# Patient Record
Sex: MALE | Race: WHITE | NOT HISPANIC OR LATINO | Employment: STUDENT | URBAN - METROPOLITAN AREA
[De-identification: names, ages, dates, MRNs, and addresses within clinical notes are randomized per-mention and may not be internally consistent; named-entity substitution may affect disease eponyms.]

---

## 2017-05-22 ENCOUNTER — COMMUNICATION - HEALTHEAST (OUTPATIENT)
Dept: FAMILY MEDICINE | Facility: CLINIC | Age: 22
End: 2017-05-22

## 2017-05-22 DIAGNOSIS — S22.000A COMPRESSION FRACTURE OF BODY OF THORACIC VERTEBRA (H): ICD-10-CM

## 2017-05-25 ENCOUNTER — COMMUNICATION - HEALTHEAST (OUTPATIENT)
Dept: FAMILY MEDICINE | Facility: CLINIC | Age: 22
End: 2017-05-25

## 2017-05-25 DIAGNOSIS — S22.000A COMPRESSION FRACTURE OF BODY OF THORACIC VERTEBRA (H): ICD-10-CM

## 2017-07-12 ENCOUNTER — TRANSFERRED RECORDS (OUTPATIENT)
Dept: HEALTH INFORMATION MANAGEMENT | Facility: CLINIC | Age: 22
End: 2017-07-12

## 2017-07-12 ENCOUNTER — RECORDS - HEALTHEAST (OUTPATIENT)
Dept: ADMINISTRATIVE | Facility: OTHER | Age: 22
End: 2017-07-12

## 2017-07-12 ENCOUNTER — COMMUNICATION - HEALTHEAST (OUTPATIENT)
Dept: FAMILY MEDICINE | Facility: CLINIC | Age: 22
End: 2017-07-12

## 2017-07-13 ENCOUNTER — TRANSFERRED RECORDS (OUTPATIENT)
Dept: HEALTH INFORMATION MANAGEMENT | Facility: CLINIC | Age: 22
End: 2017-07-13

## 2017-07-13 ENCOUNTER — RECORDS - HEALTHEAST (OUTPATIENT)
Dept: ADMINISTRATIVE | Facility: OTHER | Age: 22
End: 2017-07-13

## 2017-08-07 ENCOUNTER — COMMUNICATION - HEALTHEAST (OUTPATIENT)
Dept: FAMILY MEDICINE | Facility: CLINIC | Age: 22
End: 2017-08-07

## 2017-08-21 ENCOUNTER — COMMUNICATION - HEALTHEAST (OUTPATIENT)
Dept: FAMILY MEDICINE | Facility: CLINIC | Age: 22
End: 2017-08-21

## 2017-08-21 ENCOUNTER — OFFICE VISIT - HEALTHEAST (OUTPATIENT)
Dept: FAMILY MEDICINE | Facility: CLINIC | Age: 22
End: 2017-08-21

## 2017-08-21 DIAGNOSIS — S22.000A COMPRESSION FRACTURE OF BODY OF THORACIC VERTEBRA (H): ICD-10-CM

## 2017-08-21 DIAGNOSIS — J02.0 ACUTE STREPTOCOCCAL PHARYNGITIS: ICD-10-CM

## 2017-08-21 DIAGNOSIS — R50.9 FEVER: ICD-10-CM

## 2017-08-21 ASSESSMENT — MIFFLIN-ST. JEOR: SCORE: 1684.48

## 2017-08-24 ENCOUNTER — OFFICE VISIT - HEALTHEAST (OUTPATIENT)
Dept: FAMILY MEDICINE | Facility: CLINIC | Age: 22
End: 2017-08-24

## 2017-08-24 ENCOUNTER — COMMUNICATION - HEALTHEAST (OUTPATIENT)
Dept: FAMILY MEDICINE | Facility: CLINIC | Age: 22
End: 2017-08-24

## 2017-08-24 DIAGNOSIS — J06.9 VIRAL URI WITH COUGH: ICD-10-CM

## 2017-08-24 DIAGNOSIS — J02.0 STREP PHARYNGITIS: ICD-10-CM

## 2018-01-11 ENCOUNTER — MEDICAL CORRESPONDENCE (OUTPATIENT)
Dept: HEALTH INFORMATION MANAGEMENT | Facility: CLINIC | Age: 23
End: 2018-01-11

## 2018-01-11 ENCOUNTER — COMMUNICATION - HEALTHEAST (OUTPATIENT)
Dept: FAMILY MEDICINE | Facility: CLINIC | Age: 23
End: 2018-01-11

## 2018-01-11 ENCOUNTER — TRANSFERRED RECORDS (OUTPATIENT)
Dept: HEALTH INFORMATION MANAGEMENT | Facility: CLINIC | Age: 23
End: 2018-01-11

## 2018-01-11 ENCOUNTER — OFFICE VISIT - HEALTHEAST (OUTPATIENT)
Dept: FAMILY MEDICINE | Facility: CLINIC | Age: 23
End: 2018-01-11

## 2018-01-11 DIAGNOSIS — S22.000A COMPRESSION FRACTURE OF BODY OF THORACIC VERTEBRA (H): ICD-10-CM

## 2018-01-11 DIAGNOSIS — M85.80 OSTEOPENIA: ICD-10-CM

## 2018-01-12 LAB
25(OH)D3 SERPL-MCNC: 26 NG/ML (ref 30–80)
25(OH)D3 SERPL-MCNC: 26 NG/ML (ref 30–80)

## 2018-01-14 ENCOUNTER — COMMUNICATION - HEALTHEAST (OUTPATIENT)
Dept: FAMILY MEDICINE | Facility: CLINIC | Age: 23
End: 2018-01-14

## 2018-01-22 ENCOUNTER — COMMUNICATION - HEALTHEAST (OUTPATIENT)
Dept: FAMILY MEDICINE | Facility: CLINIC | Age: 23
End: 2018-01-22

## 2018-02-09 ENCOUNTER — COMMUNICATION - HEALTHEAST (OUTPATIENT)
Dept: FAMILY MEDICINE | Facility: CLINIC | Age: 23
End: 2018-02-09

## 2018-02-11 ENCOUNTER — HEALTH MAINTENANCE LETTER (OUTPATIENT)
Age: 23
End: 2018-02-11

## 2018-07-19 ENCOUNTER — COMMUNICATION - HEALTHEAST (OUTPATIENT)
Dept: FAMILY MEDICINE | Facility: CLINIC | Age: 23
End: 2018-07-19

## 2018-07-19 DIAGNOSIS — S22.000A COMPRESSION FRACTURE OF BODY OF THORACIC VERTEBRA (H): ICD-10-CM

## 2018-07-23 ENCOUNTER — RECORDS - HEALTHEAST (OUTPATIENT)
Dept: ADMINISTRATIVE | Facility: OTHER | Age: 23
End: 2018-07-23

## 2018-07-31 ENCOUNTER — COMMUNICATION - HEALTHEAST (OUTPATIENT)
Dept: FAMILY MEDICINE | Facility: CLINIC | Age: 23
End: 2018-07-31

## 2019-10-14 ENCOUNTER — OFFICE VISIT - HEALTHEAST (OUTPATIENT)
Dept: FAMILY MEDICINE | Facility: CLINIC | Age: 24
End: 2019-10-14

## 2019-10-14 ENCOUNTER — COMMUNICATION - HEALTHEAST (OUTPATIENT)
Dept: FAMILY MEDICINE | Facility: CLINIC | Age: 24
End: 2019-10-14

## 2019-10-14 DIAGNOSIS — R53.83 FATIGUE, UNSPECIFIED TYPE: ICD-10-CM

## 2019-10-14 DIAGNOSIS — S59.901A ELBOW INJURY, RIGHT, INITIAL ENCOUNTER: ICD-10-CM

## 2019-10-14 RX ORDER — ERGOCALCIFEROL 1.25 MG/1
50000 CAPSULE ORAL
Status: SHIPPED | COMMUNITY
Start: 2017-10-11 | End: 2021-08-31

## 2019-10-14 ASSESSMENT — MIFFLIN-ST. JEOR: SCORE: 1819.65

## 2019-10-16 ENCOUNTER — RECORDS - HEALTHEAST (OUTPATIENT)
Dept: GENERAL RADIOLOGY | Facility: CLINIC | Age: 24
End: 2019-10-16

## 2019-10-16 ENCOUNTER — AMBULATORY - HEALTHEAST (OUTPATIENT)
Dept: LAB | Facility: CLINIC | Age: 24
End: 2019-10-16

## 2019-10-16 DIAGNOSIS — S59.901A UNSPECIFIED INJURY OF RIGHT ELBOW, INITIAL ENCOUNTER: ICD-10-CM

## 2019-10-16 DIAGNOSIS — R53.83 FATIGUE, UNSPECIFIED TYPE: ICD-10-CM

## 2019-10-16 LAB
ALBUMIN SERPL-MCNC: 4.5 G/DL (ref 3.5–5)
ALP SERPL-CCNC: 103 U/L (ref 45–120)
ALT SERPL W P-5'-P-CCNC: 17 U/L (ref 0–45)
ANION GAP SERPL CALCULATED.3IONS-SCNC: 11 MMOL/L (ref 5–18)
AST SERPL W P-5'-P-CCNC: 16 U/L (ref 0–40)
BILIRUB SERPL-MCNC: 0.5 MG/DL (ref 0–1)
BUN SERPL-MCNC: 18 MG/DL (ref 8–22)
CALCIUM SERPL-MCNC: 9.9 MG/DL (ref 8.5–10.5)
CHLORIDE BLD-SCNC: 101 MMOL/L (ref 98–107)
CO2 SERPL-SCNC: 26 MMOL/L (ref 22–31)
CREAT SERPL-MCNC: 0.91 MG/DL (ref 0.7–1.3)
GFR SERPL CREATININE-BSD FRML MDRD: >60 ML/MIN/1.73M2
GLUCOSE BLD-MCNC: 80 MG/DL (ref 70–125)
POTASSIUM BLD-SCNC: 4.1 MMOL/L (ref 3.5–5)
PROT SERPL-MCNC: 7.7 G/DL (ref 6–8)
SODIUM SERPL-SCNC: 138 MMOL/L (ref 136–145)
T3 SERPL-MCNC: 100 NG/DL (ref 45–175)
T3FREE SERPL-MCNC: 3.3 PG/ML (ref 1.9–3.9)
T4 FREE SERPL-MCNC: 1 NG/DL (ref 0.7–1.8)
VIT B12 SERPL-MCNC: 395 PG/ML (ref 213–816)

## 2019-10-17 ENCOUNTER — COMMUNICATION - HEALTHEAST (OUTPATIENT)
Dept: FAMILY MEDICINE | Facility: CLINIC | Age: 24
End: 2019-10-17

## 2019-10-17 ENCOUNTER — AMBULATORY - HEALTHEAST (OUTPATIENT)
Dept: FAMILY MEDICINE | Facility: CLINIC | Age: 24
End: 2019-10-17

## 2019-10-17 DIAGNOSIS — S59.901A ELBOW INJURY, RIGHT, INITIAL ENCOUNTER: ICD-10-CM

## 2019-10-17 LAB
25(OH)D3 SERPL-MCNC: 18.6 NG/ML (ref 30–80)
B BURGDOR IGG+IGM SER QL: 0.09 INDEX VALUE
THYROID PEROXIDASE ANTIBODIES - HISTORICAL: <3 IU/ML (ref 0–5.6)

## 2019-10-18 LAB
EBV EA-D IGG SER-ACNC: 0.2 AI (ref 0–0.8)
EBV NA IGG SER IA-ACNC: >8 AI (ref 0–0.8)
EBV VCA IGG SER IA-ACNC: >8 AI (ref 0–0.8)
EBV VCA IGM SER IA-ACNC: 1.2 AI (ref 0–0.8)

## 2019-10-19 LAB — T3REVERSE SERPL-MCNC: 15.5 NG/DL (ref 9–27)

## 2019-10-29 ENCOUNTER — COMMUNICATION - HEALTHEAST (OUTPATIENT)
Dept: FAMILY MEDICINE | Facility: CLINIC | Age: 24
End: 2019-10-29

## 2020-03-20 ENCOUNTER — OFFICE VISIT - HEALTHEAST (OUTPATIENT)
Dept: FAMILY MEDICINE | Facility: CLINIC | Age: 25
End: 2020-03-20

## 2020-03-20 ENCOUNTER — COMMUNICATION - HEALTHEAST (OUTPATIENT)
Dept: FAMILY MEDICINE | Facility: CLINIC | Age: 25
End: 2020-03-20

## 2020-03-20 DIAGNOSIS — F41.9 ANXIETY: ICD-10-CM

## 2020-03-20 DIAGNOSIS — F51.02 ADJUSTMENT INSOMNIA: ICD-10-CM

## 2020-06-01 ENCOUNTER — COMMUNICATION - HEALTHEAST (OUTPATIENT)
Dept: FAMILY MEDICINE | Facility: CLINIC | Age: 25
End: 2020-06-01

## 2020-06-01 DIAGNOSIS — M35.7 HYPERMOBILITY SYNDROME: ICD-10-CM

## 2020-08-25 ENCOUNTER — COMMUNICATION - HEALTHEAST (OUTPATIENT)
Dept: FAMILY MEDICINE | Facility: CLINIC | Age: 25
End: 2020-08-25

## 2020-08-27 ENCOUNTER — AMBULATORY - HEALTHEAST (OUTPATIENT)
Dept: FAMILY MEDICINE | Facility: CLINIC | Age: 25
End: 2020-08-27

## 2020-08-27 DIAGNOSIS — F41.9 ANXIETY: ICD-10-CM

## 2020-09-11 ENCOUNTER — COMMUNICATION - HEALTHEAST (OUTPATIENT)
Dept: FAMILY MEDICINE | Facility: CLINIC | Age: 25
End: 2020-09-11

## 2020-10-19 ENCOUNTER — COMMUNICATION - HEALTHEAST (OUTPATIENT)
Dept: FAMILY MEDICINE | Facility: CLINIC | Age: 25
End: 2020-10-19

## 2020-11-16 ENCOUNTER — COMMUNICATION - HEALTHEAST (OUTPATIENT)
Dept: FAMILY MEDICINE | Facility: CLINIC | Age: 25
End: 2020-11-16

## 2020-11-23 ENCOUNTER — AMBULATORY - HEALTHEAST (OUTPATIENT)
Dept: OTHER | Facility: CLINIC | Age: 25
End: 2020-11-23

## 2020-11-23 ENCOUNTER — OFFICE VISIT - HEALTHEAST (OUTPATIENT)
Dept: FAMILY MEDICINE | Facility: CLINIC | Age: 25
End: 2020-11-23

## 2020-11-23 ENCOUNTER — DOCUMENTATION ONLY (OUTPATIENT)
Dept: OTHER | Facility: CLINIC | Age: 25
End: 2020-11-23

## 2020-11-23 DIAGNOSIS — F33.8 SEASONAL AFFECTIVE DISORDER (H): ICD-10-CM

## 2020-11-23 ASSESSMENT — PATIENT HEALTH QUESTIONNAIRE - PHQ9: SUM OF ALL RESPONSES TO PHQ QUESTIONS 1-9: 11

## 2021-01-18 ENCOUNTER — COMMUNICATION - HEALTHEAST (OUTPATIENT)
Dept: FAMILY MEDICINE | Facility: CLINIC | Age: 26
End: 2021-01-18

## 2021-01-18 DIAGNOSIS — F33.8 SEASONAL AFFECTIVE DISORDER (H): ICD-10-CM

## 2021-01-18 RX ORDER — CITALOPRAM HYDROBROMIDE 10 MG/1
10 TABLET ORAL DAILY
Qty: 30 TABLET | Refills: 5 | Status: SHIPPED | OUTPATIENT
Start: 2021-01-18 | End: 2021-08-05

## 2021-01-19 ENCOUNTER — COMMUNICATION - HEALTHEAST (OUTPATIENT)
Dept: FAMILY MEDICINE | Facility: CLINIC | Age: 26
End: 2021-01-19

## 2021-01-19 DIAGNOSIS — F33.8 SEASONAL AFFECTIVE DISORDER (H): ICD-10-CM

## 2021-03-23 ENCOUNTER — COMMUNICATION - HEALTHEAST (OUTPATIENT)
Dept: FAMILY MEDICINE | Facility: CLINIC | Age: 26
End: 2021-03-23

## 2021-03-25 ENCOUNTER — COMMUNICATION - HEALTHEAST (OUTPATIENT)
Dept: FAMILY MEDICINE | Facility: CLINIC | Age: 26
End: 2021-03-25

## 2021-03-25 ENCOUNTER — OFFICE VISIT - HEALTHEAST (OUTPATIENT)
Dept: FAMILY MEDICINE | Facility: CLINIC | Age: 26
End: 2021-03-25

## 2021-03-25 DIAGNOSIS — F33.8 SEASONAL AFFECTIVE DISORDER (H): ICD-10-CM

## 2021-03-25 DIAGNOSIS — J30.2 SEASONAL ALLERGIC RHINITIS, UNSPECIFIED TRIGGER: ICD-10-CM

## 2021-03-25 RX ORDER — MONTELUKAST SODIUM 10 MG/1
10 TABLET ORAL DAILY
Qty: 30 TABLET | Refills: 2 | Status: SHIPPED | OUTPATIENT
Start: 2021-03-25 | End: 2021-08-31

## 2021-05-27 ASSESSMENT — PATIENT HEALTH QUESTIONNAIRE - PHQ9: SUM OF ALL RESPONSES TO PHQ QUESTIONS 1-9: 11

## 2021-05-31 VITALS — BODY MASS INDEX: 17.36 KG/M2 | WEIGHT: 138 LBS

## 2021-05-31 VITALS — BODY MASS INDEX: 16.66 KG/M2 | WEIGHT: 134 LBS | HEIGHT: 75 IN

## 2021-05-31 VITALS — BODY MASS INDEX: 17.29 KG/M2 | WEIGHT: 137.4 LBS

## 2021-06-02 NOTE — PROGRESS NOTES
"SUBJECTIVE: Oscar Newby is a 24 y.o. male with:   Chief Complaint   Patient presents with     Fatigue     Arm Injury     right arm injury x 1 1/2 month during a fall        1) Fatigue - He feels tired all the time.  Has been sleeping 8-10 hours a night.  Falls asleep well but wakes up 2-3 x a night.  Eating well and exercising. He did get a flu- like illness about 1 month ago.   No fevers/ rhinorrhea.  No tick bite.  No rashes.  Has some myalgias from physical activity.  Not napping during day.  Caffeine - minimal.  Mood is ok.  No cognitive issues.  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    2) Was doing some rock climbing.  Had a fall and landed on his right arm that was extended with palm out.  Had cracking sensation in elbow.  Was able to move arm ok so did not get seen.  No swelling / bruising.  He laid off his rock climbing for a bit.  Last week went climbing and had some aching in the elbow that went down the arm.  He has a history of hypermobility syndrome.    SH: Single.  Just graduated from college.  Living with parents but plans to move out when he gets a job.    OBJECTIVE: /68 (Patient Site: Left Arm, Patient Position: Sitting, Cuff Size: Adult Regular)   Pulse (!) 59   Ht 6' 2.75\" (1.899 m)   Wt 163 lb 12.8 oz (74.3 kg)   SpO2 98%   BMI 20.61 kg/m     No acute distress.  HEENT: Head is atraumatic and/normocephalic.  PERRL.  Conjunctiva clear.  Tympanic membranes grey with normal landmarks and normal light reflexes.  No nasal discharge.  Oropharynx is pink and moist.  Sinuses nontender.  Neck: Supple.  No lymphadenopathy or thyromegaly.  Lungs: Clear to auscultation.  No wheezing, retractions, or tachypnea.  Heart: RRR. S1 and S2 normal.  No murmurs, rubs, or gallops.  Neuro: Awake, alert, oriented x 3.  Normal strength and tone.  Normal gait.   Right arm: No swelling / ecchymosis / deformities.  Normal range of motion but some snapping in the elbow " joint.    Oscar was seen today for fatigue and arm injury.    Diagnoses and all orders for this visit:    Elbow injury, right, initial encounter- Will check xray and if negative, refer for PT.  -     XR Elbow Right 2 VWS; Future    Fatigue, unspecified type- Etiology unclear.  Will check labs.  -     Comprehensive Metabolic Panel; Future  -     Vitamin D, Total (25-Hydroxy); Future  -     Vitamin B12; Future  -     T3 (Triiodothyronine), Free; Future  -     T4, Free; Future  -     Thyroid Peroxidase Antibody; Future  -     Triiodothyronine (T3), Reverse; Future  -     T3, Total; Future  -     Lyme Antibody Cascade; Future  -     Bernice-Barr Virus (EBV) Antibodies, IgG; Future  -     Bernice-Barr Virus (EBV) Capsid Antibody,IGM(EBVCAM); Future     Liesa Navarro

## 2021-06-02 NOTE — TELEPHONE ENCOUNTER
LMTCB . Please assist patient in scheduling an appointment when the patient returns the call. Thank you .

## 2021-06-02 NOTE — TELEPHONE ENCOUNTER
10-21-19  Hello just connecting back,Specialty Scheduling talked to pt to schedule Physical Therapy, he was informed with the insurance he has [facility] with a PCC listed ([facility]) [facility] is out of network, he said he was going to do some research and seek other facilitys   Thanks  Narda

## 2021-06-03 VITALS
SYSTOLIC BLOOD PRESSURE: 122 MMHG | HEART RATE: 59 BPM | HEIGHT: 75 IN | OXYGEN SATURATION: 98 % | BODY MASS INDEX: 20.37 KG/M2 | WEIGHT: 163.8 LBS | DIASTOLIC BLOOD PRESSURE: 68 MMHG

## 2021-06-04 ENCOUNTER — COMMUNICATION - HEALTHEAST (OUTPATIENT)
Dept: FAMILY MEDICINE | Facility: CLINIC | Age: 26
End: 2021-06-04

## 2021-06-04 DIAGNOSIS — G89.29 CHRONIC LEFT SHOULDER PAIN: ICD-10-CM

## 2021-06-04 DIAGNOSIS — M25.512 CHRONIC LEFT SHOULDER PAIN: ICD-10-CM

## 2021-06-07 NOTE — PATIENT INSTRUCTIONS - HE
"Magnesium glycinate 400 mg at bedtime or try MyoCalm powder ( dose on bottle)  L-theanine 100 - 200 mg two times a day for anxiety    [email or web address]  Dr. Geoff Beatty - holistic sleep specialist, [email or web address] , book- \" The Healing Night\"    [email or web address] - counseling options  [facility] at [facility] - facebook page has mind body options to reduce stress/ anxiety    For SAD:  Get a full spectrum light to use for 20-30 minutes in the am starting in the fall    Update me or schedule a f/u phone visit in 1 month          "

## 2021-06-07 NOTE — PROGRESS NOTES
"Oscar Newby is a 24 y.o. male who is being evaluated via a billable telephone visit.      The patient has been notified of following:     \"This telephone visit will be conducted via a call between you and your physician/provider. We have found that certain health care needs can be provided without the need for a physical exam.  This service lets us provide the care you need with a short phone conversation.  If a prescription is necessary we can send it directly to your pharmacy.  If lab work is needed we can place an order for that and you can then stop by our lab to have the test done at a later time.    If during the course of the call the physician/provider feels a telephone visit is not appropriate, you will not be charged for this service.\"         Oscar Newby complains of    Chief Complaint   Patient presents with     Mental Health Problem     Sleep issues   1) He has not been sleeping well for awhile.  Going on for 1 year.  Has had a lot going on in the last year- graduated from college/ traveled to [place] / got job/ got 's license.  He will wake up in middle of night.  Having more anxiety.  Goes to bed before midnight - tries to stick with 8 hours of sleep.  Falls asleep in a few minutes.  Will get 3-4 hours of sleep then go in/ out of sleep.  Does not feel rested in am.  Hard to recover from a workout.  Has been sleeping better since quarantined.  He has tried a number of interventions - colder room / weighted blanket / new mattress and mattress top / melatonin 3 mg - 6 mg / darkness / humidifier.      2) Anxiety/ Mood - Mother's family - males have SAD.  Had tough time this winter with mood.  Mood little better now that light is improved.    3) Mono- Had acute mono this fall.  Feels his energy level is better.  Was also found to have low vitamin B12/ vitamin D and he is taking supplements regularly.    SH: He lives with 2 friends.  Was doing office work.      I have reviewed and updated the " "patient's Past Medical History, Social History, Family History and Medication List.    ALLERGIES  Patient has no known allergies.      Assessment/Plan:  1. Adjustment insomnia  See suggestions below.    2. Anxiety  Magnesium/ L- theanine/ mind-body skills / counseling.    Patient Instructions   Magnesium glycinate 400 mg at bedtime or try MyoCalm powder ( dose on bottle)  L-theanine 100 - 200 mg two times a day for anxiety    [email or web address]  Dr. Geoff Beatty - holistic sleep specialist, [employer] , book- \" The Healing Night\"    ticketea - counseling options  [facility] at [facility] [employer] page has mind body options to reduce stress/ anxiety    For SAD:  Get a full spectrum light to use for 20-30 minutes in the am starting in the fall    Update me or schedule a f/u phone visit in 1 month             Phone call duration:  18 minutes    [facility]     Leisa Navarro MD    "

## 2021-06-12 NOTE — PROGRESS NOTES
"OFFICE VISIT - FAMILY MEDICINE     ASSESSMENT AND PLAN     1. Acute streptococcal pharyngitis  amoxicillin (AMOXIL) 875 MG tablet   2. Fever  Rapid Strep A Screen-Throat    Rapid Strep A Screen-Throat   3. Compression fracture of body of thoracic vertebra     Continue with good hydration, extra vitamin C, gargle with salt and water as tolerated, cough drops as needed, take the prescribed antibiotic as directed, possible side effect discussed.  Return if not improving.  Continue current treatment of osteopenia with vitamin D and  calcium, follow-up with Dr. Navarro.  CHIEF COMPLAINT   Rash (\"HIVES X 2 DAYS\"/VERY SICK/ACHY); Medication Refill (VIT D); Fatigue (X 4 DAYS); and Fever (X 4 DAYS)    HPI   Oscar Newby is a 22 y.o. male.  No Patient Care Coordination Note on file.    Patient has not been doing fine for the last for 5 days, has been noticing some rashes in different part of his body, he was able to treat that with antihistaminic over-the-counter with some improvement, for the last couple of days been having some mild scratchy throat and  low-grade fever.  Just moved to a new apartment.  No nausea or vomiting.  Does have a history of thoracic fracture with osteopenia on the bone density, currently taking vitamin D and calcium.      Review of Systems As per HPI, otherwise negative.    OBJECTIVE   /64 (Patient Site: Right Arm)  Pulse 64  Temp 100.5  F (38.1  C) (Oral)   Resp 13  Ht 6' 2.75\" (1.899 m)  Wt 134 lb (60.8 kg)  BMI 16.86 kg/m2  Physical Exam   Constitutional: He appears well-developed and well-nourished.   HENT:   Head: Normocephalic and atraumatic.   Mouth/Throat: Posterior oropharyngeal edema present.   Neck: Normal range of motion. Neck supple. No JVD present. No tracheal deviation present. No thyromegaly present.   Cardiovascular: Normal rate, regular rhythm, normal heart sounds and intact distal pulses.  Exam reveals no gallop and no friction rub.    No murmur " heard.  Pulmonary/Chest: Effort normal and breath sounds normal. No respiratory distress. He has no wheezes. He has no rales.   Musculoskeletal: He exhibits no edema or tenderness.   Lymphadenopathy:     He has no cervical adenopathy.   Psychiatric: He has a normal mood and affect. Judgment and thought content normal.       [facility]     Family History   Problem Relation Age of Onset     No Medical Problems Mother      No Medical Problems Father      No Medical Problems Brother      Social History     Social History     Marital status: Single     Spouse name: N/A     Number of children: N/A     Years of education: N/A     Occupational History     Not on file.     Social History Main Topics     Smoking status: Never Smoker     Smokeless tobacco: Not on file     Alcohol use No     Drug use: No     Sexual activity: Not on file     Other Topics Concern     Not on file     Social History Narrative       [facility]     Patient Active Problem List    Diagnosis Date Noted     Compression fracture of body of thoracic vertebra 07/12/2016     Lyme disease 06/28/2016     Esophageal Reflux      Overview Note:     Created by Conversion         Callus      Overview Note:     Created by Conversion         Acute Pharyngitis      Overview Note:     Created by Conversion         Hypermobility Syndrome      Overview Note:     Created by Conversion         No past surgical history on file.    RESULTS/CONSULTS (Lab/Rad)     Recent Results (from the past 168 hour(s))   Rapid Strep A Screen-Throat   Result Value Ref Range    Rapid Strep A Antigen Group A Strep detected (!) No Group A Strep detected, presumptive negative     No results found.  MEDICATIONS     Current Outpatient Prescriptions on File Prior to Visit   Medication Sig Dispense Refill     ergocalciferol (VITAMIN D2) 50,000 unit capsule Take 1 capsule 2x a week for 8 weeks. 16 capsule 0     No current facility-administered medications on file prior to visit.        HEALTH MAINTENANCE /  SCREENING   No Data Recorded, No Data Recorded,No Data Recorded  Immunization History   Administered Date(s) Administered     DTaP, historic 1997, 1997, 02/10/1998, 07/30/2008     Hep A, historic 07/30/2008, 11/22/2013     Hep B, historic 1997, 03/05/2001, 11/22/2013     HiB, historic 1997, 1997, 02/10/1998     IPV 1997, 02/10/1998, 10/23/1998, 03/05/2001     Influenza, Live, Nasal LAIV3 11/15/2012     Influenza,live, Nasal Laiv4 11/22/2013     MMR 10/23/1998, 03/05/2001     Meningococcal MCV4P 07/30/2008     Tdap 07/30/2008     Typhoid, historic 12/10/2013     Varicella 10/23/1998, 07/30/2008     Health Maintenance   Topic     HPV VACCINES (1 of 3 - Male 3 Dose Series)     INFLUENZA VACCINE RULE BASED (1)     TD 18+ HE      ADVANCE DIRECTIVES DISCUSSED WITH PATIENT      TDAP ADULT ONE TIME DOSE        Jose Jorge MD  Family Medicine, [facility]   This transcription uses voice recognition software, which may contain typographical errors.

## 2021-06-12 NOTE — TELEPHONE ENCOUNTER
10-28-20  Hamlet Navarro, I called pt LM with the mental health phone number to make his appointment  Thanks  Corazon

## 2021-06-12 NOTE — PROGRESS NOTES
"SUBJECTIVE:   Oscar Newby is a 22 y.o. male presents with a slightly productive cough for the last week.  He is also had a runny nose that \"ran like a faucet\" yesterday.  He was diagnosed with strep pharyngitis 3 days ago and is currently taking amoxicillin for that.  He did have a fever early on, but no fever now.  He feels like he is getting a little better.    OBJECTIVE:   Appears mildly ill.  Ears: normal  Eyes: no redness or discharge  Nose: no discharge  Oropharynx: mild erythema  Neck: no adenopathy  Lungs: clear to auscultation, no wheezes or rales and unlabored breathing. Frequent dry sounding cough  Skin: no rash.    Studies: none    ASSESSMENT:   Viral URI with a cough  Strep pharyngitis.    PLAN:     Patient Instructions     Your cough and runny nose appears to be due to a viral illness.     You also have strep and I recommend continuing your Amoxicillin.    For the cough, use an over the counter medicine with DM (dextromethorphan)           "

## 2021-06-13 NOTE — PROGRESS NOTES
"Oscar Newby is a 25 y.o. male who is being evaluated via a billable telephone visit.      The patient has been notified of following:     \"This telephone visit will be conducted via a call between you and your physician/provider. We have found that certain health care needs can be provided without the need for a physical exam.  This service lets us provide the care you need with a short phone conversation.  If a prescription is necessary we can send it directly to your pharmacy.  If lab work is needed we can place an order for that and you can then stop by our lab to have the test done at a later time.    Telephone visits are billed at different rates depending on your insurance coverage. During this emergency period, for some insurers they may be billed the same as an in-person visit.  Please reach out to your insurance provider with any questions.    If during the course of the call the physician/provider feels a telephone visit is not appropriate, you will not be charged for this service.\"    Patient has given verbal consent to a Telephone visit? Yes    What phone number would you like to be contacted at? [phone]    Patient would like to receive their AVS by AVS Preference: Kade.    Additional provider notes: no     SUBJECTIVE: Oscar Newby is a 25 y.o. male with:  Chief Complaint   Patient presents with     INITIATE SSRI    He has h/o depression and has been meeting with therapist for several sessions lately.  His therapist is recommending he start on selective serotonin reuptake inhibitor.  He has tried a number of complementary therapies in the past during the winter but no really working for patient.  He still uses light therapy and supplements.  Trying to improve sleep with magnesium / L - theanine.  He was laid off from his job but over all has been doing ok.  He has never been on any psych meds.      SH: Lives in [place] with 2 friends.  He is eating healthy.  Working on grad school " applications.  Playing AudioCatchr and doing some meditation.      Assessment/Plan:  1. Seasonal affective disorder (H)  Will start on a lower dose of Celexa and have him check in with me by My Chart in 4-6 weeks.  Discussed possible side effects on medication.  - citalopram (CELEXA) 10 MG tablet; Take 1 tablet (10 mg total) by mouth daily.  Dispense: 30 tablet; Refill: 1        Phone call duration:  12 minutes    JULISSA Gamboa

## 2021-06-15 NOTE — PROGRESS NOTES
SUBJECTIVE: Oscar Newby is a 22 y.o. male with:  Chief Complaint   Patient presents with     Follow-up     F/U TO DISCUSS REFERRAL VISITS     He has osteopenia and sees endocrinology.  He also has history of compression fractures in the lumbar spine which occurred with minimal injury.  He has some generalized back pain and feels about the same.  Dr. Giles recommended he get a consult from orthopedics at [facility] and also consider genetic testing/ evaluation.  There is history of hypermobility syndrome in his parents/ brother.  He is taking vitamin D3 5000 IU 2x daily and TUMS 2x a day.    SH: Single.  Student at  of  studying technical writing.    OBJECTIVE: BP 92/70 (Patient Site: Left Arm, Patient Position: Sitting, Cuff Size: Adult Regular)  Pulse 66  Resp 16  Wt 138 lb (62.6 kg)  SpO2 98%  BMI 17.36 kg/m2 no distress  Back: No vertebral or para lumbar tenderness.  Neuro: Normal gait.    I reviewed last Dexa and LS spines from [facility].    Oscar was seen today for follow-up.    Diagnoses and all orders for this visit:    Compression fracture of body of thoracic vertebra  -     Ambulatory referral to Orthopedics  -     Ambulatory referral to Genetics    Osteopenia  -     Ambulatory referral to Orthopedics  -     cholecalciferol, vitamin D3, (VITAMIN D3) 5,000 unit Tab; Take 2 po daily  -     Vitamin D, Total (25-Hydroxy)  -     Ambulatory referral to Genetics     Leisa Navarro

## 2021-06-16 PROBLEM — F33.8 SEASONAL AFFECTIVE DISORDER (H): Status: ACTIVE | Noted: 2021-03-25

## 2021-06-16 NOTE — PROGRESS NOTES
Oscar Newby is a 25 y.o. male who is being evaluated via a billable telephone visit.      What phone number would you like to be contacted at? [phone]   How would you like to obtain your AVS? AVS Preference: Kade.    Assessment & Plan     Oscar was seen today for follow-up.    Diagnoses and all orders for this visit:    Seasonal allergic rhinitis, unspecified trigger  -     montelukast (SINGULAIR) 10 mg tablet; Take 1 tablet (10 mg total) by mouth daily.    Seasonal affective disorder (H)      Patient Instructions   Try taking Claritin or loratadine ( generic ) 10 mg daily ( antihistamine)    Will also prescribe Singulair 10 mg daily to take which can also help    If no improvement, let me know and we will do allergy referral    You can wean citalopram 5 mg daily for 2 weeks then stop next month then I would start up again in October.   69}     Return in about 1 year (around 3/25/2022) for Recheck.    Leisa Navarro MD  [facility]   Oscar Nebwy is 25 y.o. and presents today for the following health issues   HPI  Last winter he had lot of fatigue at the end of winter.  Blood testing did show mono.  Now he is waking up tired.  He has some congestion in the right side of his nose.  Having some headaches.  He has some rhinorrhea/ sneezing.  He does have some itchy eyes.  No drainage.  Feels like allergies.  Tried Benadryl / fexofenadine but no improvement.      SAD- He continues on the citalopram 10 mg daily.  He is also doing some counseling.  This has helped his mood and has been a better winter season than in the past.  Wonders about coming off the medicine for the summer.    Patient Active Problem List   Diagnosis     Acute Pharyngitis     Hypermobility Syndrome     Esophageal Reflux     Callus     Lyme disease     Compression fracture of body of thoracic vertebra (H)        Review of Systems  No fever/ sore throat / swollen glands.      Objective        Vitals:  No vitals were obtained today due to virtual visit.    Physical Exam  none    PHQ-9 Total Score: 11 (11/23/2020  5:23 PM)           Phone call duration: 15 minutes

## 2021-06-16 NOTE — PATIENT INSTRUCTIONS - HE
Try taking Claritin or loratadine ( generic ) 10 mg daily ( antihistamine)    Will also prescribe Singulair 10 mg daily to take which can also help    If no improvement, let me know and we will do allergy referral    You can wean citalopram 5 mg daily for 2 weeks then stop next month then I would start up again in October.

## 2021-07-03 NOTE — ADDENDUM NOTE
Addendum Note by Leisa Dozier MD at 6/14/2017  5:16 PM     Author: Leisa Dozier MD Service: -- Author Type: Physician    Filed: 6/14/2017  5:16 PM Encounter Date: 5/25/2017 Status: Signed    : Leisa Dozier MD (Physician)    Addended by: LEISA DOZIER on: 6/14/2017 05:16 PM        Modules accepted: Orders

## 2021-07-03 NOTE — ADDENDUM NOTE
Addendum Note by Leisa Dozier MD at 3/20/2020  1:40 PM     Author: Leisa Dozier MD Service: -- Author Type: Physician    Filed: 4/7/2020 10:00 AM Encounter Date: 3/20/2020 Status: Signed    : Leisa Dozier MD (Physician)    Addended by: LEISA DOZIER on: 4/7/2020 10:00 AM        Modules accepted: Level of Service

## 2021-08-14 ENCOUNTER — HEALTH MAINTENANCE LETTER (OUTPATIENT)
Age: 26
End: 2021-08-14

## 2021-08-31 ENCOUNTER — VIRTUAL VISIT (OUTPATIENT)
Dept: FAMILY MEDICINE | Facility: CLINIC | Age: 26
End: 2021-08-31
Payer: COMMERCIAL

## 2021-08-31 DIAGNOSIS — F33.8 SEASONAL AFFECTIVE DISORDER (H): Primary | ICD-10-CM

## 2021-08-31 DIAGNOSIS — E55.9 VITAMIN D DEFICIENCY: ICD-10-CM

## 2021-08-31 DIAGNOSIS — F41.9 ANXIETY: ICD-10-CM

## 2021-08-31 PROCEDURE — 99213 OFFICE O/P EST LOW 20 MIN: CPT | Mod: 95 | Performed by: FAMILY MEDICINE

## 2021-08-31 RX ORDER — DESVENLAFAXINE 25 MG/1
25 TABLET, EXTENDED RELEASE ORAL DAILY
Qty: 30 TABLET | Refills: 1 | Status: SHIPPED | OUTPATIENT
Start: 2021-08-31 | End: 2021-11-10

## 2021-08-31 RX ORDER — FAMOTIDINE 20 MG
TABLET ORAL
COMMUNITY
End: 2024-08-16

## 2021-08-31 ASSESSMENT — ANXIETY QUESTIONNAIRES
2. NOT BEING ABLE TO STOP OR CONTROL WORRYING: MORE THAN HALF THE DAYS
4. TROUBLE RELAXING: MORE THAN HALF THE DAYS
GAD7 TOTAL SCORE: 11
7. FEELING AFRAID AS IF SOMETHING AWFUL MIGHT HAPPEN: SEVERAL DAYS
1. FEELING NERVOUS, ANXIOUS, OR ON EDGE: MORE THAN HALF THE DAYS
3. WORRYING TOO MUCH ABOUT DIFFERENT THINGS: MORE THAN HALF THE DAYS
5. BEING SO RESTLESS THAT IT IS HARD TO SIT STILL: SEVERAL DAYS
6. BECOMING EASILY ANNOYED OR IRRITABLE: SEVERAL DAYS

## 2021-08-31 ASSESSMENT — PATIENT HEALTH QUESTIONNAIRE - PHQ9: SUM OF ALL RESPONSES TO PHQ QUESTIONS 1-9: 10

## 2021-08-31 NOTE — PROGRESS NOTES
Oscar is a 26 year old who is being evaluated via a billable video visit.      How would you like to obtain your AVS? MyChart  If the video visit is dropped, the invitation should be resent by: Text to cell phone: [phone]  Will anyone else be joining your video visit? No      Video Start Time: 1:49 PM    Assessment & Plan     Vitamin D deficiency  Recheck level on high dose replacement.  - Vitamin D Deficiency    Seasonal affective disorder (H)  Discussed options- Genesight testing ( cannot afford now).  Retry citaloopram later in year when having symptoms.  Trial of Pristiq.  He would like to start Pristiq.  - desvenlafaxine succinate (PRISTIQ) 25 MG 24 hr tablet  Dispense: 30 tablet; Refill: 1    Anxiety  See below  - desvenlafaxine succinate (PRISTIQ) 25 MG 24 hr tablet  Dispense: 30 tablet; Refill: 1    Patient Instructions   1) Make a lab only visit to check vitamin D  2) Restart on L-theanine 100 mg - Take 1-2 twice daily  3) Start on Pristiq ( desvenlafaxine) 25 mg daily.  4) Update me with My Chart message in a few weeks                Depression Screening Follow Up    PHQ 8/31/2021   PHQ-9 Total Score 10   Q9: Thoughts of better off dead/self-harm past 2 weeks Not at all         Follow Up Actions Taken  Patient counseled, no additional follow up at this time.     See Patient Instructions    Return in about 3 years (around 8/31/2024), or My Chart message.    Leisa Navarro MD  [facility]   Oscar is a 26 year old who presents for the following health issues     HPI  He has a history of SAD. Wanted to start selective serotonin reuptake inhibitor now to make sure mood was stable during fall/ winter. He started citalopram 10 mg taking it in the mid morning but then started waking up with severe anxiety at 4 am and felt very hot -like brain on fire.  He stopped the medicine and no further issues with waking up after a few days.  He thinks he was having side  effects.  He took citalopram last year during the winter and did well.    He was trying to take the selective serotonin reuptake inhibitor to prevent decreased mood in the winter.  At this time his mood is doing well.  He does not feel depressed.  Typically feels well in summer.    He has been feeling more anxious/ stressed lately.  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  Looking for assistantship to help with finances.  His girlfriend is a nurse and he will be seeing her less.  Lots of life changes now.  Has not been very busy this summer.  He has been taking vitamin D3 92559 international unit(s) daily.  He took L-theanine for a year and then stopped.  He thinks it was helpful.    Patient Active Problem List   Diagnosis     Acute Pharyngitis     Hypermobility Syndrome     Esophageal Reflux     Callus     Lyme disease     Compression fracture of body of thoracic vertebra (H)     Seasonal affective disorder (H)              Review of Systems         Objective           Vitals:  No vitals were obtained today due to virtual visit.    Physical Exam   GENERAL: Healthy, alert and no distress  EYES: Eyes grossly normal to inspection.  No discharge or erythema, or obvious scleral/conjunctival abnormalities.  RESP: No audible wheeze, cough, or visible cyanosis.  No visible retractions or increased work of breathing.    SKIN: Visible skin clear. No significant rash, abnormal pigmentation or lesions.  NEURO: Cranial nerves grossly intact.  Mentation and speech appropriate for age.  PSYCH: Mentation appears normal, affect normal/bright, judgement and insight intact, normal speech and appearance well-groomed.                Video-Visit Details    Type of service:  Video Visit    Video End Time:2:05 PM    Originating Location (pt. Location): Home    Distant Location (provider location):  [facility]     Platform used for Video Visit: [employer]

## 2021-09-01 ASSESSMENT — ANXIETY QUESTIONNAIRES: GAD7 TOTAL SCORE: 11

## 2021-10-10 ENCOUNTER — HEALTH MAINTENANCE LETTER (OUTPATIENT)
Age: 26
End: 2021-10-10

## 2022-01-11 ENCOUNTER — OFFICE VISIT (OUTPATIENT)
Dept: FAMILY MEDICINE | Facility: CLINIC | Age: 27
End: 2022-01-11
Payer: COMMERCIAL

## 2022-01-11 VITALS
DIASTOLIC BLOOD PRESSURE: 82 MMHG | WEIGHT: 167.19 LBS | HEART RATE: 69 BPM | SYSTOLIC BLOOD PRESSURE: 106 MMHG | HEIGHT: 75 IN | BODY MASS INDEX: 20.79 KG/M2 | OXYGEN SATURATION: 97 %

## 2022-01-11 DIAGNOSIS — M35.7 HYPERMOBILITY SYNDROME: ICD-10-CM

## 2022-01-11 DIAGNOSIS — E55.9 VITAMIN D DEFICIENCY: ICD-10-CM

## 2022-01-11 DIAGNOSIS — Z20.822 EXPOSURE TO 2019 NOVEL CORONAVIRUS: ICD-10-CM

## 2022-01-11 DIAGNOSIS — R05.9 COUGH: Primary | ICD-10-CM

## 2022-01-11 DIAGNOSIS — R79.9 ABNORMAL BLOOD CHEMISTRY: ICD-10-CM

## 2022-01-11 DIAGNOSIS — S22.000A COMPRESSION FRACTURE OF BODY OF THORACIC VERTEBRA (H): ICD-10-CM

## 2022-01-11 DIAGNOSIS — Z13.220 SCREENING FOR CHOLESTEROL LEVEL: ICD-10-CM

## 2022-01-11 LAB
CHOLEST SERPL-MCNC: 241 MG/DL
FASTING STATUS PATIENT QL REPORTED: ABNORMAL
HDLC SERPL-MCNC: 72 MG/DL
LDLC SERPL CALC-MCNC: 131 MG/DL
TRIGL SERPL-MCNC: 190 MG/DL

## 2022-01-11 PROCEDURE — 36415 COLL VENOUS BLD VENIPUNCTURE: CPT | Performed by: FAMILY MEDICINE

## 2022-01-11 PROCEDURE — 82306 VITAMIN D 25 HYDROXY: CPT | Performed by: FAMILY MEDICINE

## 2022-01-11 PROCEDURE — 90471 IMMUNIZATION ADMIN: CPT | Performed by: FAMILY MEDICINE

## 2022-01-11 PROCEDURE — 99000 SPECIMEN HANDLING OFFICE-LAB: CPT | Performed by: FAMILY MEDICINE

## 2022-01-11 PROCEDURE — 99395 PREV VISIT EST AGE 18-39: CPT | Mod: 25 | Performed by: FAMILY MEDICINE

## 2022-01-11 PROCEDURE — 99213 OFFICE O/P EST LOW 20 MIN: CPT | Mod: 25 | Performed by: FAMILY MEDICINE

## 2022-01-11 PROCEDURE — 90651 9VHPV VACCINE 2/3 DOSE IM: CPT | Performed by: FAMILY MEDICINE

## 2022-01-11 PROCEDURE — 86617 LYME DISEASE ANTIBODY: CPT | Mod: 90 | Performed by: FAMILY MEDICINE

## 2022-01-11 PROCEDURE — 80061 LIPID PANEL: CPT | Performed by: FAMILY MEDICINE

## 2022-01-11 PROCEDURE — 86769 SARS-COV-2 COVID-19 ANTIBODY: CPT | Mod: 90 | Performed by: FAMILY MEDICINE

## 2022-01-11 RX ORDER — CHOLECALCIFEROL (VITAMIN D3) 125 MCG
CAPSULE ORAL
COMMUNITY
End: 2024-08-16

## 2022-01-11 ASSESSMENT — MIFFLIN-ST. JEOR: SCORE: 1826.05

## 2022-01-11 ASSESSMENT — ENCOUNTER SYMPTOMS: COUGH: 1

## 2022-01-11 NOTE — PROGRESS NOTES
SUBJECTIVE:   CC: Oscar Newby is an 26 year old male who presents for preventative health visit.       Patient has been advised of split billing requirements and indicates understanding: Yes  Healthy Habits:     Getting at least 3 servings of Calcium per day:  NO    Bi-annual eye exam:  NO    Dental care twice a year:  NO    Sleep apnea or symptoms of sleep apnea:  None    Diet:  Regular (no restrictions)    Frequency of exercise:  1 day/week    Duration of exercise:  15-30 minutes    Taking medications regularly:  Yes    Medication side effects:  None    PHQ-2 Total Score: 2    Additional concerns today:  Yes  Cough            Today's PHQ-2 Score:   PHQ-2 ( 1999 Pf[employer]) 1/11/2022   Q1: Little interest or pleasure in doing things 1   Q2: Feeling down, depressed or hopeless 1   PHQ-2 Score 2   Q1: Little interest or pleasure in doing things Several days   Q2: Feeling down, depressed or hopeless Several days   PHQ-2 Score 2       Abuse: Current or Past(Physical, Sexual or Emotional)- No  Do you feel safe in your environment? Yes        Social History     Tobacco Use     Smoking status: Never Smoker     Smokeless tobacco: Never Used   Substance Use Topics     Alcohol use: Yes     Alcohol/week: 2.0 standard drinks     Types: 2 Cans of beer per week     If you drink alcohol do you typically have >3 drinks per day or >7 drinks per week? No    Alcohol Use 1/11/2022   Prescreen: >3 drinks/day or >7 drinks/week? No       Last PSA: No results found for: PSA    Reviewed orders with patient. Reviewed health maintenance and updated orders accordingly -  Reviewed and updated as needed this visit by clinical staff  Tobacco  Allergies  Meds             Reviewed and updated as needed this visit by Provider                Review of Systems   Respiratory: Positive for cough.      Cough 25 days  Feels with bronchitis  Dry  Somewhat triggered by coughing as well as one time when he laid on his left side like a pinching  sensation  No inhalers  No history of asthma  Does have a dog at home    Doing well currently from a mental health standpoint    Family history significant for neuropathy in his father  History of hypermobile joints  History of compression fracture with prior osteoporosis diagnosis    OBJECTIVE:   There were no vitals taken for this visit.    Physical Exam      Physical:  General Appearance: Healthy-appearingy.   Head:  No deformity  Eyes: Sclerae white, pupils equal and reactive, extra ocular movements intact   Ears: Well-positioned, well-formed pinnae; TM pearly white, translucent, no bulging   Nose: Clear, normal mucosa no drainage or crusting  Throat: Lips, tongue, and mucosa are moist, pink and intact; tongue no thrush oral pharynx no injection or lesions  Neck: Supple, symmetric ROM no nodes   No carotid Buits  Chest: Lungs clear to auscultation, no retractions  Heart: Regular rate & rhythm, S1 S2, no murmur  Abdomen: Soft, non-tender, no masses; umbilical area normal   Pulses: Equal femoral pulses  : No hernia palpable   Extremities: Well-perfused, warm and dry, No Edema  Palpable Pulses Bilateral  Neuro: Easily aroused good tone NO tremor   Skin  No Rash no dysplastic looking nevi            ASSESSMENT/PLAN:   Oscar was seen today for physical and cough.    Diagnoses and all orders for this visit:    Cough  -     SARS-CoV-2 Nucleocapsid Total Ab; Future    Screening for cholesterol level  -     Lipid Profile (Chol, Trig, HDL, LDL calc); Future    Vitamin D deficiency  -     Vitamin D Deficiency; Future    Abnormal blood chemistry  -     Lyme Confirm IgG by Immunoblot; Future    Exposure to 2019 novel coronavirus  -     SARS-CoV-2 Nucleocapsid Total Ab; Future    Compression fracture of body of thoracic vertebra (H)    Hypermobility syndrome        Patient has been advised of split billing requirements and indicates understanding: No  COUNSELING:   Reviewed preventive health counseling, as reflected in  "patient instructions       Regular exercise       Healthy diet/nutrition    Estimated body mass index is 20.61 kg/m  as calculated from the following:    Height as of 10/14/19: 1.899 m (6' 2.75\").    Weight as of 10/14/19: 74.3 kg (163 lb 12.8 oz).         He reports that he has never smoked. He has never used smokeless tobacco.      Counseling Resources:  ATP IV Guidelines  Pooled Cohorts Equation Calculator  FRAX Risk Assessment  ICSI Preventive Guidelines  Dietary Guidelines for Americans, 2010  USDA's MyPlate  ASA Prophylaxis  Lung CA Screening  Here  Francisco Quiros MD  [facility]  "

## 2022-01-11 NOTE — PATIENT INSTRUCTIONS
Nice to meet you Oscar    Try to find or identify a trigger for your cough  Hopefully it would just get better over time  Worsening symptoms, increasing pain, unusual symptoms or a cluster of symptoms would need further work-up    Rechecking labs today    Will start HPV series  The first shot will be today  The next shot should be 2 months from now  The third shot can be next year at a physical examination    Actively Pursue Wellness    Eat Whole Foods with High Nutritional Value  -----High Fats Nuts, Olive Oil, Fish, Avocados  -----Lean Meats  -----Vegetables Colorful and In abundance  -----Fresh fruits  -----Beans, Legumes  and Whole Grains    Engage in Moderate Exercise  -----30-60 minutes daily    Get Intentional Restorative Sleep  -----8-10 hours    Cultivate and Maintain Healthy Relationships  -----Family and Friends  -----Work Place  -----Community      Remove and Harmful Factors  -----Stressors  Work and Home   -----Toxins:  Tobacco, Alcohol in Excess, Processed Sugars (White Stuff and High Fructose Corn Syrup, Pollutants (Air and Water)      Be Present and Mindful for Yourself and Family  -----Laugh Together  -----Talk Together and Listen to your Body and Family and Friends  -----Enjoy Meals together        There are Five Documented ways to Reduce Risk  for Chronic Illness:    Exercise Daily  30-60 minutes  No Tobacco Products  Red Wine 4-8 oz daily  BMI less than 25  Mediterranean Style Diet     Eat more Plants>> in abundance and of many colors!  Phyto-nutrients activate our health potential.  Choose:  More Colors. More Diversity  Get More Benefit.    Cultivate that inner Compost bin!    Gianni

## 2022-01-11 NOTE — LETTER
January 14, 2022      Oscar JOSHUA Odin  [address]  [place] [address]        Dear ,    We are writing to inform you of your test results.    These are all okay Oscar     Maybe decrease the amount of vitamin D  No evidence of wild-type COVID infection in the past  Triglycerides are likely slightly elevated due to not fasting  HDL excellent  LDL reasonable    In general more fiber less animal fat  Lower body movement    Great to see you and meet you!    Gianni    Adult Physical AVS      Thank you for scheduling and completing a Physical Check up!      There has been suggestions that this is an Unnecessary part of the current care for patients by some. I do not agree!    It is a reminder to take stock in an overall health plan.     It also hopefully will engage dialogue about wellness and focusing on measure to stay well and fit, hopefully avoiding extra trips to see us!      -----------Wellness Pillars-----------    1. Nutrient Dense Nutrition-- we are or will become and are activated by what we eat! It is paramount that we all focus on eating well. This means trying to eat ?hole Foods,  single ingredient foods that nourish and activate our bodies. Food that are high in nutrients help run our bodies in a way that aranda and can transform our health and help us to heal and repair. Major groups include. Fats----preferably plant based. These help ours brains and nervous system. Proteins---from plants and animals. Nuts, Beans and Animal proteins. These help our musculoskeletal system. Complex Carbohydrates---fresh fruits vegetables, and whole grains. In order to maintain balance, we must give our bodies balanced nutrition. There are things we should avoid. Most processed foods and all added sugar should be avoided. If you do not prepare your own food, order simple foods a la carte. Order a protein and pair it with a side of vegetables. Vegetable should occupy more than half of your plate. Try to avoid simple  carbohydrates like chips or fries.   2. Restorative Sleep----we all need sleep to allow our bodies to heal and repair. Sleep is indispensable and necessary. Good quality sleep is different from the simple quantitative amount of sleep. It is possible to sleep without getting any rest or restorative sleep. This is why we ask about refreshing sleep, because it is possible to sleep and not have restorative sleep. If your sleep is not refreshing, you may requires a visit with a Sleep Specialist to help sort this out. For quantity, aim for 8-10 hours daily.   3. Movement---exercise has numerous health benefits. Bottom line, in order to do the things we do in life, it is necessary to condition, nurture and repair our machine. Food provides the tools and the basic repair structure and vital nutrients. Rest allows time and focus for repair and restoration. Exercise conditions and activated reparative mechanisms. I would suggest thinking about one's high school weight as a rule and aim for a weight plus 15 pounds for men and 10 pounds for women as a goal. Also we should strive to engage in intentional activity 3 to 4 days of cardio fitness 30-60 minutes and 1-3 days of strength training. We want to be fit as we age and have stamina to do activities of daily living and beyond. Walk, bike, swim, run, box, dance, and so on, find your thing! The benefits are incredible! Exercise lowers blood pressure, helps treat and prevent diabetes and helps us live longer and in a way that is more satisfying. As Archie says,  Just Do It!  4. Mindfulness----get in touch with your mind body connection. Take time daily to reflect on and be cognizant of how you are doing----eating, working, parenting, interacting with loved ones, friends and others. Be intentional and present in relating to things in which you are engaged.     Preventative Care    Colon Cancer screening. It may not be glamorous, but it saves lives and may save yours. There is  colonoscopy and immuno-chemical testing. Pick your mode , but get it done. If there is a first degree relative that has had Colon Cancer, we may recommend screening 10 years before the age of that index case.     Breast Cancer Screening. This is mostly for women. Get to know the architecture of your breasts. If it makes you feel more comfortable, engage your partner as well. If something strikes you as ?ot right,  get it checked out. Mammogram breast cancer screening does detect cancers. Self breast exams can detect cancers. Guidelines vary but in general start screen in adulthood for self exams and mammography in your 40s. If there is a first degree relative or many with cancers, this may be earlier or involve genetic screening.     Pap Smears Cervical Cancer Screening---again women. Start screening after sexual activity or intercourse begins. Cervical cancer really is tied to exposure to HPV virus and this is related to sexual intercourse. Cervical cancer is treatable and preventable, as Pap smears should detect changes BEFORE cancerous transformation.   All women that have a cervix should be screen between 21-65 as a rule. Intervals vary based on age and medical history.     Vaccines. Yes there is controversy. But I still think vaccines save lives and reduce disease burden in our human populations. Please consider getting vaccinated as you are due for Vaccines.     About Vitamins and Supplements     I do recommend that adult and kids consider a multivitamin as way to enrich our nutrition.     A solid multivitamin. Ask one of us for recommendations. Our pharmacy carry some high quality lower cost vitamins that we have recommends.     [place] 3/6 Oils Fish Oils, Flax Oils, Krill Oils, Algea Oils, and Cumin Seed and Borage Oils are among the Omega 3s and 6s. Good oils nourish our nervous system and engage our immune system in positive ways.     Vitamin D. If you are in [place]. You probably need some. We shoot for a  level of 50-60. So sometime this takes staring lower and titrating up a supplement. Start at 5012-7541 IU wit a fatty meal and check levels.             Certain supplements may help with our gut's immune system or Microbiome.   Start with plants, many and of diverse colors.   Consider cultured foods with live active bacteria. Examples are Yogurt, Kefir and Kombucha.     Eat Prebiotic foods from the Chicori family, asparagus, bananas, inulin fibers and more.     Other supplements that may help are Zinc Carnosine, D-limonene, Vitamin D and Colostrum.     It may prudent to try the Elimination Diet and eliminate certain foods such as Wheat products, Dairy Products and Especially Refined Sugars.       We are screening multiple issues:     High Blood Pressure.   We ideally have readings less than 130 on the top number and less than 80 on the bottom.     Diabetes.   Diabetes is the body not processing sugars in an efficient way. When sugars are not dealt with in an efficient manner, every part of the body can be effected, the heart such as a heart attack or failure, the brain such as a stroke and really any part of the body. Insulin levels being overworked really drives diabetes. Lowering intake of simple sugar and exercising are two major ways to treat and stave off Diabetes.     Heart Disease:  Heart issues usually arise out of a combination of genetic issues and life stressors and choices.   Focusing on the Pillars of Wellness are ways to help prevent heart disease. Avoiding tobacco, limiting alcohol intake to moderate intake and addressing out  of balance cholesterol, presence of diabetes and persistently elevated blood pressure may require medications in addition to life style changes.     Skin cancer is typically due to cumulative Sun Damage. There are other genetic factor as well. If skin looks irritated or a mole changes color, shape, size or has irregular borders, get it checked out. Skin exams can also save  lives.     If you are Diabetic or Have Known Heart Disease. We have goals for your best Care     A1C. For Diabetics     This is a measure of how well controlled your sugars are over the last 3 months. In general, we would like the percent number less than 7% for most diabetics. In the morning and before all meals the sugar number should be less than 150. If you are diabetic and this is the case, you are managing well. The Pillars of Wellness are still a guide to keeping your body in balance.     Blood Pressure for Diabetics and Heart Disease     Top < 130 Bottom < 80  This goal of blood pressure control reduces Diabetes complications, such as stroke or heart attack. Life style first and add medication if consistently high.     No Tobacco. For Anyone!    Smoking tobacco or chewing produces by products that are particularly harmful to Diabetics, but really all of us. I implore you to not use tobacco products.     Aspirin for Heart Disease Patient     If you have high blood pressure, stroke or heart disease risk, you probably would benefit from a baby aspirin. There reasons to avoid aspirin such as allergies, risks for bleeding, etc. have the discussion of aspirin should be part of your therapy.     Statin Medications for Heart Disease, Vascular Disease or High Risk Patients    These medicine have statistically been shown to benefit Diabetic patients, especially those with disordered cholesterol profiles. I like to do an NMR panel that shows Atherogenic Risk (Stroke/ Heart Attack) and Insulin Resistance. Likely this may be recommended as part of you treatment plan.         Resulted Orders   Lipid Profile (Chol, Trig, HDL, LDL calc)   Result Value Ref Range    Cholesterol 241 (H) <=199 mg/dL    Triglycerides 190 (H) <=149 mg/dL    Direct Measure HDL 72 >=40 mg/dL      Comment:      HDL Cholesterol Reference Range:     0-2 years:   No reference ranges established for patients under 2 years old  at [facility]  for lipid analytes.    2-8 years:  Greater than 45 mg/dL     18 years and older:   Female: Greater than or equal to 50 mg/dL   Male:   Greater than or equal to 40 mg/dL    LDL Cholesterol Calculated 131 (H) <=129 mg/dL    Patient Fasting > 8hrs? Unknown    Vitamin D Deficiency   Result Value Ref Range    Vitamin D, Total (25-Hydroxy) 88 (H) 30 - 80 ug/L    Narrative    Deficiency <10.0 ug/L  Insufficiency 10.0-29.9 ug/L  Sufficiency 30.0-80.0 ug/L  Toxicity (possible) >100.0 ug/L    Lyme Confirm IgG by Immunoblot   Result Value Ref Range    Lyme Confirm IgG by Immunoblot Negative Negative      Comment:      Band(s) present: 23 kDa  (Insufficient number of bands for positive result)  INTERPRETIVE INFORMATION: B. burgdorferi IgG Immunoblot    For this assay, a positive result is reported when any 5 or   more of the following 10 bands are present: 18, 23, 28, 30,   39, 41, 45, 58, 66, or 93 kDa.  All other banding patterns   are reported as negative.  Performed By: [facility]  [address] [place] [id number]  : Margaret Collier MD   SARS-CoV-2 Nucleocapsid Total Ab   Result Value Ref Range    SARS-CoV-2 Nucleocapsid Total Ab, S Negative Negative      Comment:      No antibodies to SARS-CoV-2 detected. Negative results   may occur in serum collected too soon following   infection,in immunosuppressed patients or in patients   with mild or asymptomatic infection. This test does   not rule out active or recent COVID-19 infection and   will not detect SARS-CoV-2 vaccine-induced antibodies.  Follow up testing with a molecular test is recommended   in symptomatic patients.     -------------------ADDITIONAL INFORMATION-------------------  Testing was performed using the Roche Elecsys   Anti-SARS-CoV-2 Reagent assay from [facility],   which has received Emergency Use Authorization(EUA)  by the U.S. Food and Drug Administration.     Fact sheets for this Emergency Use Authorization (EUA)    assay can be found at the following links:      For Healthcare Providers:  https://www.fda.gov/media/685825/download  For Patients:  https://www.fda.gov/media/304744/download    Patient's Race White     Patient's Ethnicity Not        Comment:         Test Performed by:  [place] [id number]  : Sonny Xie M.D. Ph.D.; CLIA# [id number]       If you have any questions or concerns, please call the clinic at the number listed above.       Sincerely,      Francisco Quiros MD

## 2022-01-12 LAB
DEPRECATED CALCIDIOL+CALCIFEROL SERPL-MC: 88 UG/L (ref 30–80)
SARS-COV-2 AB SERPL QL IA: NEGATIVE

## 2022-01-14 LAB — B BURGDOR IGG SER QL IB: NEGATIVE

## 2022-03-08 ENCOUNTER — ANCILLARY PROCEDURE (OUTPATIENT)
Dept: GENERAL RADIOLOGY | Facility: CLINIC | Age: 27
End: 2022-03-08
Attending: NURSE PRACTITIONER
Payer: COMMERCIAL

## 2022-03-08 ENCOUNTER — OFFICE VISIT (OUTPATIENT)
Dept: URGENT CARE | Facility: URGENT CARE | Age: 27
End: 2022-03-08
Payer: COMMERCIAL

## 2022-03-08 VITALS
HEART RATE: 71 BPM | DIASTOLIC BLOOD PRESSURE: 67 MMHG | SYSTOLIC BLOOD PRESSURE: 116 MMHG | WEIGHT: 165 LBS | BODY MASS INDEX: 20.9 KG/M2

## 2022-03-08 DIAGNOSIS — S46.001A INJURY OF RIGHT ROTATOR CUFF, INITIAL ENCOUNTER: Primary | ICD-10-CM

## 2022-03-08 DIAGNOSIS — S49.91XA SHOULDER INJURY, RIGHT, INITIAL ENCOUNTER: ICD-10-CM

## 2022-03-08 PROBLEM — E55.9 VITAMIN D DEFICIENCY: Status: ACTIVE | Noted: 2017-03-22

## 2022-03-08 PROBLEM — M85.80 OSTEOPENIA: Status: ACTIVE | Noted: 2017-03-22

## 2022-03-08 PROCEDURE — 99214 OFFICE O/P EST MOD 30 MIN: CPT | Performed by: NURSE PRACTITIONER

## 2022-03-08 PROCEDURE — 73030 X-RAY EXAM OF SHOULDER: CPT | Mod: RT | Performed by: RADIOLOGY

## 2022-03-08 ASSESSMENT — PAIN SCALES - GENERAL: PAINLEVEL: MILD PAIN (2)

## 2022-03-09 NOTE — PATIENT INSTRUCTIONS
Patient Education     Rotator Cuff Tear  The rotator cuff is a group of muscles and tendons that surround the shoulder joint. These muscles and tendons hold the arm in its joint. They also help the shoulder move and rotate. The rotator cuff can be torn from overuse or injury. Gradual wear and tear can lead to inflammation of these tendons. This can progress to gradual or sudden tears.  Symptoms of a torn rotator cuff include:    Shoulder pain that gets worse when you raise your arm overhead    Weakness of the shoulder muscles with overhead activity    Popping and clicking when you move your shoulder    Shoulder pain that wakes you up at night when sleeping on the hurt shoulder  Your healthcare provider may suspect a rotator cuff injury based on your symptoms and a physical exam. You may also have an MRI or arthroscopy. Arthroscopy is a surgical procedure to look inside the joint through a small tube. X-rays may be taken to determine if there is another reason for your pain, such as an abnormality in the bone.  Partial rotator cuff tears can be treated by first resting, then strengthening the rotator cuff muscles. Anti-inflammatory medicines, such as ibuprofen or naproxen, are useful. Your healthcare provider can give you a limited number of steroid injections. Your provider may recommend surgery for complete tears and partial tears that don't respond to medical treatment.  Home care    Try to avoid activities that make your pain worse. This includes overhead activities, doing the same motion over and over, and heavy lifting.    You may use over-the-counter pain medicines to control pain, unless another medicine was prescribed. If you have chronic liver or kidney disease or ever had a stomach ulcer or gastrointestinal bleeding, talk with your healthcare provider before using these medicines.    If you were given a sling, use it for comfort. After your pain decreases, don t keep your arm in the sling all the time.  Take your arm out several times a day and move the shoulder joint, as you are able.    Your healthcare provider may recommend gentle pendulum exercises. Stand or sit with your arm vertical and close to your side. Relax your shoulder muscles and gently swing the arm forward and back, side to side, and in small circles for about 5 minutes. Do this once or twice a day. There should be only slight pain with this exercise.    You may benefit from physical therapy or a home exercise program to strengthen your shoulder muscles. This will also increase your pain-free range of motion. Applying heat before exercises can help prepare the muscles and joint for activity. Talk to your healthcare provider about what is best for your condition.  Follow-up care  Follow up with your healthcare provider, or as advised.  When to seek medical advice  Call your healthcare provider right away if the following occurs:    Increasing shoulder pain or pain radiating down the arm to the hand  Call 911  Call 911 or get immediate medical care if any of the following occur:    Rapid swelling in the involved shoulder or arm    Numbness, tingling, or loss of strength down the arm to the hand  Mary last reviewed this educational content on 5/1/2018 2000-2021 [employer]. All rights reserved. This information is not intended as a substitute for professional medical care. Always follow your healthcare professional's instructions.

## 2022-03-09 NOTE — TELEPHONE ENCOUNTER
DIAGNOSIS: Injury of right rotator cuff/ Nathalia Muenchow NP/ XR   APPOINTMENT DATE: 3.15.22   NOTES STATUS DETAILS   OFFICE NOTE from other specialist Internal 3.8.22 [facility]   MEDICATION LIST Internal    XRAYS (IMAGES & REPORTS) Internal 93.8.22 R shoulder

## 2022-03-09 NOTE — PROGRESS NOTES
Chief Complaint   Patient presents with     Shoulder Pain     injured right shoulder 1 hour ago     SUBJECTIVE:  Oscar Newby is a 26 year old male who presents to the clinic today with right shoulder injury. He was rock climbing, felt and heard crunch, tear, has shoulder joint instability, ROM limited overhead past 90 degrees. No numbness, tingling, weakness, pallor.    Past Medical History:   Diagnosis Date     Hypermobility of joint      Osteoporosis      Seasonal affective disorder (H)      desvenlafaxine succinate (PRISTIQ) 25 MG 24 hr tablet, TAKE ONE TABLET BY MOUTH ONE TIME DAILY  L-Theanine 200 MG CAPS,   MAGNESIUM GLYCINATE ORAL, [MAGNESIUM GLYCINATE ORAL] Take 400 mg by mouth daily.  Vitamin D, Cholecalciferol, 25 MCG (1000 UT) CAPS, Pt is taking 94072 units per day    No current facility-administered medications on file prior to visit.    Social History     Tobacco Use     Smoking status: Never Smoker     Smokeless tobacco: Never Used   Substance Use Topics     Alcohol use: Yes     Alcohol/week: 2.0 standard drinks     Types: 2 Cans of beer per week     No Known Allergies    Review of Systems   All systems negative except for those listed above in HPI.    EXAM:   /67   Pulse 71   Wt 74.8 kg (165 lb)   BMI 20.90 kg/m      Physical Exam  Vitals reviewed.   Constitutional:       General: He is not in acute distress.     Appearance: Normal appearance. He is not ill-appearing, toxic-appearing or diaphoretic.   HENT:      Head: Normocephalic and atraumatic.      Nose: Nose normal.      Mouth/Throat:      Mouth: Mucous membranes are moist.      Pharynx: Oropharynx is clear.   Eyes:      Extraocular Movements: Extraocular movements intact.      Conjunctiva/sclera: Conjunctivae normal.      Pupils: Pupils are equal, round, and reactive to light.   Cardiovascular:      Rate and Rhythm: Normal rate.   Pulmonary:      Effort: Pulmonary effort is normal.   Musculoskeletal:         General: Tenderness and  signs of injury present. No swelling or deformity.      Cervical back: Normal range of motion and neck supple.      Comments: Right shoulder rotator cuff, AC joint tenderness. Limited ROM cannot go above 90 degrees over head. Normal humberto lift off, empty can test, sullivan martin.   Skin:     General: Skin is warm and dry.      Findings: No bruising, erythema or rash.   Neurological:      General: No focal deficit present.      Mental Status: He is alert and oriented to person, place, and time.   Psychiatric:         Mood and Affect: Mood normal.         Behavior: Behavior normal.       Xray done in clinic read by me as negative for fracture or dislocation.  Results for orders placed or performed in visit on 03/08/22   XR Shoulder Right G/E 3 Views     Status: None    Narrative    EXAM: XR SHOULDER RIGHT G/E 3 VIEWS  LOCATION: [facility]  DATE/TIME: 3/8/2022 6:55 PM    INDICATION: He was rock climbing, felt and heard crunch, tear, has shoulder joint instability, ROM limited overhead past 90 degrees.  COMPARISON: None.      Impression    IMPRESSION: Normal joint spaces and alignment. No fracture.      ASSESSMENT:    ICD-10-CM    1. Injury of right rotator cuff, initial encounter  S46.001A Orthopedic  Referral   2. Shoulder injury, right, initial encounter  S49.91XA XR Shoulder Right G/E 3 Views     CANCELED: XR Shoulder Right 2 Views     PLAN:    Xray without fracture or dislocation  Possible rotator cuff tear, tendonitis  Rest, ice, heat, sling given in clinic  Rotate tylenol and ibuprofen  Ortho for follow-up as urgent care is limited without imaging beyond x-ray, may need steroid injection, PT, MRI, etc    Patient Instructions     Patient Education     Rotator Cuff Tear  The rotator cuff is a group of muscles and tendons that surround the shoulder joint. These muscles and tendons hold the arm in its joint. They also help the shoulder move and rotate. The rotator cuff can be  torn from overuse or injury. Gradual wear and tear can lead to inflammation of these tendons. This can progress to gradual or sudden tears.  Symptoms of a torn rotator cuff include:    Shoulder pain that gets worse when you raise your arm overhead    Weakness of the shoulder muscles with overhead activity    Popping and clicking when you move your shoulder    Shoulder pain that wakes you up at night when sleeping on the hurt shoulder  Your healthcare provider may suspect a rotator cuff injury based on your symptoms and a physical exam. You may also have an MRI or arthroscopy. Arthroscopy is a surgical procedure to look inside the joint through a small tube. X-rays may be taken to determine if there is another reason for your pain, such as an abnormality in the bone.  Partial rotator cuff tears can be treated by first resting, then strengthening the rotator cuff muscles. Anti-inflammatory medicines, such as ibuprofen or naproxen, are useful. Your healthcare provider can give you a limited number of steroid injections. Your provider may recommend surgery for complete tears and partial tears that don't respond to medical treatment.  Home care    Try to avoid activities that make your pain worse. This includes overhead activities, doing the same motion over and over, and heavy lifting.    You may use over-the-counter pain medicines to control pain, unless another medicine was prescribed. If you have chronic liver or kidney disease or ever had a stomach ulcer or gastrointestinal bleeding, talk with your healthcare provider before using these medicines.    If you were given a sling, use it for comfort. After your pain decreases, don t keep your arm in the sling all the time. Take your arm out several times a day and move the shoulder joint, as you are able.    Your healthcare provider may recommend gentle pendulum exercises. Stand or sit with your arm vertical and close to your side. Relax your shoulder muscles and gently  swing the arm forward and back, side to side, and in small circles for about 5 minutes. Do this once or twice a day. There should be only slight pain with this exercise.    You may benefit from physical therapy or a home exercise program to strengthen your shoulder muscles. This will also increase your pain-free range of motion. Applying heat before exercises can help prepare the muscles and joint for activity. Talk to your healthcare provider about what is best for your condition.  Follow-up care  Follow up with your healthcare provider, or as advised.  When to seek medical advice  Call your healthcare provider right away if the following occurs:    Increasing shoulder pain or pain radiating down the arm to the hand  Call 911  Call 911 or get immediate medical care if any of the following occur:    Rapid swelling in the involved shoulder or arm    Numbness, tingling, or loss of strength down the arm to the hand  Mary last reviewed this educational content on 5/1/2018 2000-2021 [employer]. All rights reserved. This information is not intended as a substitute for professional medical care. Always follow your healthcare professional's instructions.             Follow up with primary care provider with any problems, questions or concerns or if symptoms worsen or fail to improve. Patient agreed to plan and verbalized understanding.    KATELYN Burrell  [facility]

## 2022-03-15 ENCOUNTER — OFFICE VISIT (OUTPATIENT)
Dept: ORTHOPEDICS | Facility: CLINIC | Age: 27
End: 2022-03-15
Payer: COMMERCIAL

## 2022-03-15 ENCOUNTER — PRE VISIT (OUTPATIENT)
Dept: ORTHOPEDICS | Facility: CLINIC | Age: 27
End: 2022-03-15
Payer: COMMERCIAL

## 2022-03-15 DIAGNOSIS — S46.001A INJURY OF RIGHT ROTATOR CUFF, INITIAL ENCOUNTER: ICD-10-CM

## 2022-03-15 PROCEDURE — 99203 OFFICE O/P NEW LOW 30 MIN: CPT | Performed by: FAMILY MEDICINE

## 2022-03-15 ASSESSMENT — PATIENT HEALTH QUESTIONNAIRE - PHQ9: SUM OF ALL RESPONSES TO PHQ QUESTIONS 1-9: 0

## 2022-03-15 NOTE — PROGRESS NOTES
CHIEF COMPLAINT:  Pain of the Right Shoulder       HISTORY OF PRESENT ILLNESS  Mr. Newby is a pleasant 26 year old male who presents to clinic today with right shoulder pain.  Oscar was rock climbing last week when he was pulling himself up with his right arm abducted and extended, he felt a sudden slip in his shoulder.  This was immediately painful, causing him to fall and ago.  Since then his shoulder has been quite painful.  He was seen at an urgent care facility where he had a reassuring x-ray.  He is feeling somewhat better, although he is still playful.  He is using his sling as helpful.  This is his dominant arm.        Additional history: as documented    MEDICAL HISTORY  Patient Active Problem List   Diagnosis     Acute pharyngitis     Hypermobility syndrome     Esophageal reflux     Callus     Compression fracture     Seasonal affective disorder (H)     Osteopenia     Vitamin D deficiency       Current Outpatient Medications   Medication Sig Dispense Refill     desvenlafaxine succinate (PRISTIQ) 25 MG 24 hr tablet TAKE ONE TABLET BY MOUTH ONE TIME DAILY 30 tablet 3     L-Theanine 200 MG CAPS        MAGNESIUM GLYCINATE ORAL [MAGNESIUM GLYCINATE ORAL] Take 400 mg by mouth daily.       Vitamin D, Cholecalciferol, 25 MCG (1000 UT) CAPS Pt is taking 35398 units per day         No Known Allergies    Family History   Problem Relation Age of Onset     No Known Problems Mother      No Known Problems Father      No Known Problems Brother      Anxiety Disorder Brother        Additional medical/Social/Surgical histories reviewed in [facility] and updated as appropriate.        PHYSICAL EXAM  General  - normal appearance, in no obvious distress  HEENT  - conjunctivae not injected, moist mucous membranes  CV  - normal radial pulse  Pulm  - normal respiratory pattern, non-labored  Musculoskeletal - right shoulder  - inspection: normal bone and joint alignment, no obvious deformity, no scapular winging, no AC step-off  -  palpation: no bony or soft tissue tenderness, normal clavicle, non-tender AC  - ROM: painful end range flexion, IR, ER  - strength: 5/5  strength, 5/5 in all shoulder planes  - special tests:  (-) Speed's  (-) Neer  (-) Hawkin's  (-) Kaylyn's  (-) apprehension  Neuro  - no sensory or motor deficit, grossly normal coordination, normal muscle tone  Skin  - no ecchymosis, erythema, warmth, or induration, no obvious rash  Psych  - interactive, appropriate, normal mood and affect             ASSESSMENT & PLAN  Mr. Newby is a 26 year old male who presents to clinic today with right shoulder pain.    I reviewed his x-ray in the room with him, this shows no obvious acute or chronic issue, no Hill-Sachs lesion, his shoulder does look to be aligned appropriately.    Oscar may have suffered a subluxation moment or acute injury to his cough, I am happy that his exam is reassuring today.  I am referring him to physical therapy, specifically for shoulder stability.  If his symptoms or not improving whatsoever over the coming 4 to 6 weeks I would likely recommend an MRI with special attention to the labrum.    It was a pleasure seeing Oscar today.    Santiago Pineda DO, [facility]  Primary Care Sports Medicine      This note was constructed using Dragon dictation software, please excuse any minor errors in spelling, grammar, or syntax.

## 2022-03-15 NOTE — LETTER
3/15/2022      RE: Oscar Newby  [address] [place] [address]       CHIEF COMPLAINT:  Pain of the Right Shoulder       HISTORY OF PRESENT ILLNESS  Mr. Newby is a pleasant 26 year old male who presents to clinic today with right shoulder pain.  Oscar was rock climbing last week when he was pulling himself up with his right arm abducted and extended, he felt a sudden slip in his shoulder.  This was immediately painful, causing him to fall and ago.  Since then his shoulder has been quite painful.  He was seen at an urgent care facility where he had a reassuring x-ray.  He is feeling somewhat better, although he is still playful.  He is using his sling as helpful.  This is his dominant arm.        Additional history: as documented    MEDICAL HISTORY  Patient Active Problem List   Diagnosis     Acute pharyngitis     Hypermobility syndrome     Esophageal reflux     Callus     Compression fracture     Seasonal affective disorder (H)     Osteopenia     Vitamin D deficiency       Current Outpatient Medications   Medication Sig Dispense Refill     desvenlafaxine succinate (PRISTIQ) 25 MG 24 hr tablet TAKE ONE TABLET BY MOUTH ONE TIME DAILY 30 tablet 3     L-Theanine 200 MG CAPS        MAGNESIUM GLYCINATE ORAL [MAGNESIUM GLYCINATE ORAL] Take 400 mg by mouth daily.       Vitamin D, Cholecalciferol, 25 MCG (1000 UT) CAPS Pt is taking 26868 units per day         No Known Allergies    Family History   Problem Relation Age of Onset     No Known Problems Mother      No Known Problems Father      No Known Problems Brother      Anxiety Disorder Brother        Additional medical/Social/Surgical histories reviewed in [facility] and updated as appropriate.        PHYSICAL EXAM  General  - normal appearance, in no obvious distress  HEENT  - conjunctivae not injected, moist mucous membranes  CV  - normal radial pulse  Pulm  - normal respiratory pattern, non-labored  Musculoskeletal - right shoulder  - inspection: normal bone and  joint alignment, no obvious deformity, no scapular winging, no AC step-off  - palpation: no bony or soft tissue tenderness, normal clavicle, non-tender AC  - ROM: painful end range flexion, IR, ER  - strength: 5/5  strength, 5/5 in all shoulder planes  - special tests:  (-) Speed's  (-) Neer  (-) Hawkin's  (-) Kaylyn's  (-) apprehension  Neuro  - no sensory or motor deficit, grossly normal coordination, normal muscle tone  Skin  - no ecchymosis, erythema, warmth, or induration, no obvious rash  Psych  - interactive, appropriate, normal mood and affect             ASSESSMENT & PLAN  Mr. Newby is a 26 year old male who presents to clinic today with right shoulder pain.    I reviewed his x-ray in the room with him, this shows no obvious acute or chronic issue, no Hill-Sachs lesion, his shoulder does look to be aligned appropriately.    Oscar may have suffered a subluxation moment or acute injury to his cough, I am happy that his exam is reassuring today.  I am referring him to physical therapy, specifically for shoulder stability.  If his symptoms or not improving whatsoever over the coming 4 to 6 weeks I would likely recommend an MRI with special attention to the labrum.    It was a pleasure seeing Oscar today.    Santiago Pineda DO, [facility]  Primary Care Sports Medicine      This note was constructed using Dragon dictation software, please excuse any minor errors in spelling, grammar, or syntax.        Santiago Pineda DO

## 2022-03-16 ENCOUNTER — THERAPY VISIT (OUTPATIENT)
Dept: PHYSICAL THERAPY | Facility: CLINIC | Age: 27
End: 2022-03-16
Attending: FAMILY MEDICINE
Payer: COMMERCIAL

## 2022-03-16 DIAGNOSIS — S46.001A INJURY OF RIGHT ROTATOR CUFF, INITIAL ENCOUNTER: ICD-10-CM

## 2022-03-16 DIAGNOSIS — M25.511 ACUTE PAIN OF RIGHT SHOULDER: ICD-10-CM

## 2022-03-16 PROCEDURE — 97161 PT EVAL LOW COMPLEX 20 MIN: CPT | Mod: GP | Performed by: PHYSICAL THERAPIST

## 2022-03-16 PROCEDURE — 97110 THERAPEUTIC EXERCISES: CPT | Mod: GP | Performed by: PHYSICAL THERAPIST

## 2022-03-16 PROCEDURE — 97112 NEUROMUSCULAR REEDUCATION: CPT | Mod: GP | Performed by: PHYSICAL THERAPIST
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
## 2022-03-23 ENCOUNTER — THERAPY VISIT (OUTPATIENT)
Dept: PHYSICAL THERAPY | Facility: CLINIC | Age: 27
End: 2022-03-23
Payer: COMMERCIAL

## 2022-03-23 DIAGNOSIS — M25.511 ACUTE PAIN OF RIGHT SHOULDER: ICD-10-CM

## 2022-03-23 PROCEDURE — 97110 THERAPEUTIC EXERCISES: CPT | Mod: GP | Performed by: PHYSICAL THERAPIST

## 2022-03-30 ENCOUNTER — THERAPY VISIT (OUTPATIENT)
Dept: PHYSICAL THERAPY | Facility: CLINIC | Age: 27
End: 2022-03-30
Payer: COMMERCIAL

## 2022-03-30 DIAGNOSIS — M25.511 ACUTE PAIN OF RIGHT SHOULDER: ICD-10-CM

## 2022-03-30 PROCEDURE — 97110 THERAPEUTIC EXERCISES: CPT | Mod: GP | Performed by: PHYSICAL THERAPIST

## 2022-03-30 PROCEDURE — 97112 NEUROMUSCULAR REEDUCATION: CPT | Mod: GP | Performed by: PHYSICAL THERAPIST

## 2022-04-06 ENCOUNTER — THERAPY VISIT (OUTPATIENT)
Dept: PHYSICAL THERAPY | Facility: CLINIC | Age: 27
End: 2022-04-06
Payer: COMMERCIAL

## 2022-04-06 DIAGNOSIS — M25.511 ACUTE PAIN OF RIGHT SHOULDER: ICD-10-CM

## 2022-04-06 PROCEDURE — 97112 NEUROMUSCULAR REEDUCATION: CPT | Mod: GP | Performed by: PHYSICAL THERAPIST

## 2022-04-06 PROCEDURE — 97110 THERAPEUTIC EXERCISES: CPT | Mod: GP | Performed by: PHYSICAL THERAPIST

## 2022-05-04 ENCOUNTER — THERAPY VISIT (OUTPATIENT)
Dept: PHYSICAL THERAPY | Facility: CLINIC | Age: 27
End: 2022-05-04
Payer: COMMERCIAL

## 2022-05-04 DIAGNOSIS — S46.001A INJURY OF RIGHT ROTATOR CUFF, INITIAL ENCOUNTER: ICD-10-CM

## 2022-05-04 DIAGNOSIS — M25.511 ACUTE PAIN OF RIGHT SHOULDER: Primary | ICD-10-CM

## 2022-05-04 PROCEDURE — 97110 THERAPEUTIC EXERCISES: CPT | Mod: GP | Performed by: PHYSICAL THERAPIST

## 2022-06-03 ENCOUNTER — THERAPY VISIT (OUTPATIENT)
Dept: PHYSICAL THERAPY | Facility: CLINIC | Age: 27
End: 2022-06-03
Payer: COMMERCIAL

## 2022-06-03 DIAGNOSIS — M25.511 ACUTE PAIN OF RIGHT SHOULDER: Primary | ICD-10-CM

## 2022-06-03 PROCEDURE — 97110 THERAPEUTIC EXERCISES: CPT | Mod: GP | Performed by: PHYSICAL THERAPIST

## 2022-06-03 PROCEDURE — 97530 THERAPEUTIC ACTIVITIES: CPT | Mod: GP | Performed by: PHYSICAL THERAPIST

## 2022-07-08 ENCOUNTER — THERAPY VISIT (OUTPATIENT)
Dept: PHYSICAL THERAPY | Facility: CLINIC | Age: 27
End: 2022-07-08
Payer: COMMERCIAL

## 2022-07-08 DIAGNOSIS — M25.511 ACUTE PAIN OF RIGHT SHOULDER: Primary | ICD-10-CM

## 2022-07-08 PROCEDURE — 97110 THERAPEUTIC EXERCISES: CPT | Mod: GP | Performed by: PHYSICAL THERAPIST

## 2022-07-08 PROCEDURE — 97530 THERAPEUTIC ACTIVITIES: CPT | Mod: GP | Performed by: PHYSICAL THERAPIST

## 2022-07-08 NOTE — PROGRESS NOTES
PROGRESS  REPORT    Progress reporting period is from 3/16/22 to 7/8/22.       SUBJECTIVE  Subjective changes noted by patient:  Subjective: Vijay reports continued improvement, not 100 percent yet, but feels that the shoulder continues to improve overal. Working on some swimming which is going well overall and is training with weights as well.    Current pain level is 0/10 Current Pain level: 0/10.     Previous pain level was  2/10 Initial Pain level: 2/10.   Changes in function:  Yes (See Goal flowsheet attached for changes in current functional level)  Adverse reaction to treatment or activity: None    OBJECTIVE  Changes noted in objective findings:  Yes, improved ROM, strength, stability, posture, control, and function  Objective: AROM: Flexion 180, Abduction 180, ER 70, IR T7. MMT: ER0 5/5, ER90 5/5, IR 5/5, MT 4+/5, LT 5-/5. Full PROM pain free without issue.     ASSESSMENT/PLAN  Updated problem list and treatment plan: Diagnosis 1: Right shoulder pain/instability   Pain -  self management, education and home program  Decreased ROM/flexibility - manual therapy and therapeutic exercise  Decreased joint mobility - manual therapy and therapeutic exercise  Decreased strength - therapeutic exercise and therapeutic activities  Impaired muscle performance - neuro re-education  Decreased function - therapeutic activities  Impaired posture - neuro re-education  STG/LTGs have been met or progress has been made towards goals:  Yes (See Goal flow sheet completed today.)  Assessment of Progress: Patient is meeting short term goals and is progressing towards long term goals.  Self Management Plans:  Patient has been instructed in a home treatment program.  Patient  has been instructed in self management of symptoms.  I have re-evaluated this patient and find that the nature, scope, duration and intensity of the therapy is appropriate for the medical condition of the patient.  Oscar continues to require the following  intervention to meet STG and LTG's:  PT    Recommendations:  This patient would benefit from continued therapy.     Frequency:  1 X a month, once daily  Duration:  for 1-2 months as needed         Please refer to the daily flowsheet for treatment today, total treatment time and time spent performing 1:1 timed codes.      Inquires  Chuy Vargas PT, DPT, CSCS  Physical Therapist  [facility] [address], [place] [address] [phone]

## 2022-07-20 NOTE — PATIENT INSTRUCTIONS
1) Make a lab only visit to check vitamin D  2) Restart on L-theanine 100 mg - Take 1-2 twice daily  3) Start on Pristiq ( desvenlafaxine) 25 mg daily.  4) Update me with My Chart message in a few weeks   0

## 2022-08-10 PROBLEM — M25.511 ACUTE PAIN OF RIGHT SHOULDER: Status: RESOLVED | Noted: 2022-03-16 | Resolved: 2022-08-10

## 2022-08-10 NOTE — PROGRESS NOTES
Patient did not return for further treatment and no additional progress was noted.  Please refer to the progress note and goal flowsheet completed on 07/08/22 for discharge information.    Inquires  Chuy Vargas PT, DPT, [facility]  Physical Therapist  [facility] [address], [address] [address] [phone]

## 2022-09-18 ENCOUNTER — HEALTH MAINTENANCE LETTER (OUTPATIENT)
Age: 27
End: 2022-09-18

## 2023-05-06 ENCOUNTER — HEALTH MAINTENANCE LETTER (OUTPATIENT)
Age: 28
End: 2023-05-06

## 2023-07-03 ENCOUNTER — OFFICE VISIT (OUTPATIENT)
Dept: FAMILY MEDICINE | Facility: CLINIC | Age: 28
End: 2023-07-03
Payer: COMMERCIAL

## 2023-07-03 VITALS
HEART RATE: 64 BPM | WEIGHT: 163.04 LBS | SYSTOLIC BLOOD PRESSURE: 113 MMHG | HEIGHT: 74 IN | BODY MASS INDEX: 20.92 KG/M2 | OXYGEN SATURATION: 99 % | DIASTOLIC BLOOD PRESSURE: 69 MMHG

## 2023-07-03 DIAGNOSIS — G89.29 CHRONIC MIDLINE LOW BACK PAIN WITHOUT SCIATICA: ICD-10-CM

## 2023-07-03 DIAGNOSIS — M81.0 OSTEOPOROSIS WITHOUT CURRENT PATHOLOGICAL FRACTURE, UNSPECIFIED OSTEOPOROSIS TYPE: Primary | ICD-10-CM

## 2023-07-03 DIAGNOSIS — M54.50 CHRONIC MIDLINE LOW BACK PAIN WITHOUT SCIATICA: ICD-10-CM

## 2023-07-03 LAB
ALBUMIN SERPL BCG-MCNC: 4.9 G/DL (ref 3.5–5.2)
ALP SERPL-CCNC: 58 U/L (ref 40–129)
ALT SERPL W P-5'-P-CCNC: 42 U/L (ref 0–70)
ANION GAP SERPL CALCULATED.3IONS-SCNC: 11 MMOL/L (ref 7–15)
AST SERPL W P-5'-P-CCNC: 28 U/L (ref 0–45)
BILIRUB SERPL-MCNC: 0.4 MG/DL
BUN SERPL-MCNC: 14 MG/DL (ref 6–20)
CALCIUM SERPL-MCNC: 9.6 MG/DL (ref 8.6–10)
CHLORIDE SERPL-SCNC: 105 MMOL/L (ref 98–107)
CREAT SERPL-MCNC: 0.81 MG/DL (ref 0.67–1.17)
DEPRECATED CALCIDIOL+CALCIFEROL SERPL-MC: 34 UG/L (ref 20–75)
DEPRECATED HCO3 PLAS-SCNC: 25 MMOL/L (ref 22–29)
GFR SERPL CREATININE-BSD FRML MDRD: >90 ML/MIN/1.73M2
GLUCOSE SERPL-MCNC: 88 MG/DL (ref 70–99)
POTASSIUM SERPL-SCNC: 4.1 MMOL/L (ref 3.4–5.3)
PROT SERPL-MCNC: 7.7 G/DL (ref 6.4–8.3)
SODIUM SERPL-SCNC: 141 MMOL/L (ref 136–145)

## 2023-07-03 PROCEDURE — 90651 9VHPV VACCINE 2/3 DOSE IM: CPT | Performed by: STUDENT IN AN ORGANIZED HEALTH CARE EDUCATION/TRAINING PROGRAM

## 2023-07-03 PROCEDURE — 91312 COVID-19 BIVALENT 12+ (PFIZER): CPT | Performed by: STUDENT IN AN ORGANIZED HEALTH CARE EDUCATION/TRAINING PROGRAM

## 2023-07-03 PROCEDURE — 0124A COVID-19 BIVALENT 12+ (PFIZER): CPT | Performed by: STUDENT IN AN ORGANIZED HEALTH CARE EDUCATION/TRAINING PROGRAM

## 2023-07-03 PROCEDURE — 90471 IMMUNIZATION ADMIN: CPT | Performed by: STUDENT IN AN ORGANIZED HEALTH CARE EDUCATION/TRAINING PROGRAM

## 2023-07-03 PROCEDURE — 82306 VITAMIN D 25 HYDROXY: CPT | Performed by: STUDENT IN AN ORGANIZED HEALTH CARE EDUCATION/TRAINING PROGRAM

## 2023-07-03 PROCEDURE — 80053 COMPREHEN METABOLIC PANEL: CPT | Performed by: STUDENT IN AN ORGANIZED HEALTH CARE EDUCATION/TRAINING PROGRAM

## 2023-07-03 PROCEDURE — 36415 COLL VENOUS BLD VENIPUNCTURE: CPT | Performed by: STUDENT IN AN ORGANIZED HEALTH CARE EDUCATION/TRAINING PROGRAM

## 2023-07-03 PROCEDURE — 99214 OFFICE O/P EST MOD 30 MIN: CPT | Mod: 25 | Performed by: STUDENT IN AN ORGANIZED HEALTH CARE EDUCATION/TRAINING PROGRAM

## 2023-07-03 ASSESSMENT — ANXIETY QUESTIONNAIRES
GAD7 TOTAL SCORE: 3
6. BECOMING EASILY ANNOYED OR IRRITABLE: NOT AT ALL
3. WORRYING TOO MUCH ABOUT DIFFERENT THINGS: SEVERAL DAYS
2. NOT BEING ABLE TO STOP OR CONTROL WORRYING: NOT AT ALL
1. FEELING NERVOUS, ANXIOUS, OR ON EDGE: NOT AT ALL
GAD7 TOTAL SCORE: 3
6. BECOMING EASILY ANNOYED OR IRRITABLE: NOT AT ALL
1. FEELING NERVOUS, ANXIOUS, OR ON EDGE: NOT AT ALL
IF YOU CHECKED OFF ANY PROBLEMS ON THIS QUESTIONNAIRE, HOW DIFFICULT HAVE THESE PROBLEMS MADE IT FOR YOU TO DO YOUR WORK, TAKE CARE OF THINGS AT HOME, OR GET ALONG WITH OTHER PEOPLE: NOT DIFFICULT AT ALL
5. BEING SO RESTLESS THAT IT IS HARD TO SIT STILL: SEVERAL DAYS
7. FEELING AFRAID AS IF SOMETHING AWFUL MIGHT HAPPEN: NOT AT ALL
5. BEING SO RESTLESS THAT IT IS HARD TO SIT STILL: SEVERAL DAYS
3. WORRYING TOO MUCH ABOUT DIFFERENT THINGS: SEVERAL DAYS
GAD7 TOTAL SCORE: 3
7. FEELING AFRAID AS IF SOMETHING AWFUL MIGHT HAPPEN: NOT AT ALL
IF YOU CHECKED OFF ANY PROBLEMS ON THIS QUESTIONNAIRE, HOW DIFFICULT HAVE THESE PROBLEMS MADE IT FOR YOU TO DO YOUR WORK, TAKE CARE OF THINGS AT HOME, OR GET ALONG WITH OTHER PEOPLE: NOT DIFFICULT AT ALL
2. NOT BEING ABLE TO STOP OR CONTROL WORRYING: NOT AT ALL

## 2023-07-03 ASSESSMENT — PATIENT HEALTH QUESTIONNAIRE - PHQ9
5. POOR APPETITE OR OVEREATING: SEVERAL DAYS
SUM OF ALL RESPONSES TO PHQ QUESTIONS 1-9: 2
5. POOR APPETITE OR OVEREATING: SEVERAL DAYS
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
## 2023-07-03 NOTE — PROGRESS NOTES
Assessment & Plan     Osteoporosis without current pathological fracture, unspecified osteoporosis type  - Oscar has a history of osteoporosis since a skiing accident at the age of 19. He has not received treatment besides Vit. D and Calcium for it, and a DEXA scan is warranted to assess the current status. Discussed bone strengthening impact exercises and increasing calcium in diet.    Chronic midline low back pain without sciatica  - Oscar experiences recurring back pain with a dull ache and sharp sensations. The pain spreads to his right shoulder but does not significantly interfere with his job. Pain seems stable and not requiring medication today.    Plan:  - Refer Oscar to adult Endocrinology consult for his osteoporosis.  - Order Oscar a DEXA scan to evaluate the current status of his osteoporosis and assess the need for treatment.  - Advise Oscar to resume taking Vitamin D and calcium supplements as recommended by the endocrinologist.  - Encourage Oscar to continue his healthy lifestyle practices, including regular exercise, proper nutrition, and adequate sleep.  - Recommend Oscar to do more weight-bearing exercises rather than swimming to promote proper bone health.  - Educate Oscar about the potential benefits of maintaining a consistent exercise routine and proper body mechanics to manage his chronic back pain.  - Schedule a follow-up appointment to review the scan results, reassess symptoms, and discuss further management options as necessary.  - Discuss preventive measures and lifestyle modifications to reduce the risk of future fractures, such as weight-bearing exercises and a balanced diet rich in calcium and vitamin D.  - Update Immunizations today: COVID-19 BIVALENT 12+ (PFIZER) & HPV9 (GARDASIL 9)  - Labs ordered: CMP and Vitamin D    Pedro Blackburn, MS3    Loli Henderson MD  [facility]    Subjective   Oscar is a 27 year old, presenting for the following  health issues:  Establishing care (Fracture, wants a back scan . Would like an xray it has been 6-7 years) and Imm/Inj (HPV, and Covid Pfizer Bivalent)    Oscar, a 27-year-old male, presents today with several health concerns. Firstly, he is seeking to establish care and is interested in obtaining a back scan due to a fracture. It has been approximately 6-7 years since his last X-ray. Additionally, he mentions immunization and injection needs, specifically HPV and the Pfizer Bivalent COVID vaccine.    Oscar has been dealing with osteoporosis since the age of 19 when he sustained compression fractures from a skiing accident after going off a jump. A bone density scan was conducted on 7/23/18, although the specific results are not recalled except for a slight improvement compared to the previous scan. Currently, Oscar experiences an average pain level of 2/10 in his back, with episodes of debilitating pain reaching 7.5/10, severely affecting his function. Ibuprofen provides relief during these episodes. As a result, he is requesting a scan of his back. Previously, Oscar consulted with Dr. Giles in endocrinology, who recommended regular intake of vitamin D and calcium supplements. However, he stopped taking these supplements approximately 1.5 months ago.    Approximately one and a half years ago, Oscar experienced a subluxation while rock climbing, which was subsequently treated with physical therapy. Although the issue has mostly resolved, he perceives residual weakness in the affected area.    In terms of lifestyle, Oscar reports getting 7-9 hours of sleep per night. He engages in swimming several times a week for 30-50 minutes each session, and he walks his 115-pound [place]ees dog twice daily for about 20 minutes each time. 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 His dietary habits typically  "consist of eggs, doss, toast, and cereal for breakfast, while lunch and dinner primarily consist of vegetables and protein.    Oscar currently takes Pristiq, which was originally prescribed by Dr. Navarro for seasonal affective disorder. However, he has recently started receiving the prescription from an unfamiliar provider. He has been taking Pristiq daily for approximately one year and expresses a desire to gradually reduce or discontinue the medication to assess its necessity. Oscar's PHQ-9 score is 2, indicating minimal depressive symptoms.    Regarding substance use, Oscar consumes 1-2 beers once or twice weekly, and he discontinued THC use a few months ago. He is sexually active with a single partner and has no concerns related to his sexual health.        7/3/2023     9:31 AM   Additional Questions   Roomed by Ety   Accompanied by Self     HPI     Chronic/Recurring Back Pain      Where is your back pain located? (Select all that apply) low back bilateral and upper back right    How would you describe your back pain?  dull ache and sharp    Where does your back pain spread? the right shoulder    Does your back pain interfere with your job? Not really, but it can be distracting    Review of Systems   Constitutional, HEENT, cardiovascular, pulmonary, GI, , musculoskeletal, neuro, skin, endocrine and psych systems are negative, except as otherwise noted.      Objective    /69 (BP Location: Right arm, Patient Position: Sitting, Cuff Size: Adult Regular)   Pulse 64   Ht 1.88 m (6' 2\")   Wt 74 kg (163 lb 0.6 oz)   SpO2 99%   BMI 20.93 kg/m    Body mass index is 20.93 kg/m .     Physical Exam   GENERAL: healthy, alert and no distress  EYES: Eyes grossly normal to inspection, PERRL and conjunctivae and sclerae normal  RESP: lungs clear to auscultation - no rales, rhonchi or wheezes  CV: regular rate and rhythm, normal S1 S2, no S3 or S4, no murmur, click or rub, no peripheral edema and peripheral " pulses strong  MS: no gross musculoskeletal defects noted, no edema  NEURO: Normal strength and tone, mentation intact and speech normal  PSYCH: mentation appears normal, affect normal/bright       Resident/Fellow Attestation   I, Loli Henderson MD, was present with the medical/OSIRIS student who participated in the service and in the documentation of the note.  I have verified the history and personally performed the physical exam and medical decision making.  I agree with the assessment and plan of care as documented in the note.      Loli Henderson MD  PGY3

## 2023-07-03 NOTE — PROGRESS NOTES
# Today labs for 25 D, ca, P, alk phos and urine for UA and ca cr ratio.  # If Needed, may Re-TREAT with 50,000 IU D per week for three months. ALSO needs to be on 1000 IU D per day consistently.  # FU 6 months  # before next visit:  -** schedule lumbar and thoracic spine to assess for compression fractures   -** Dxa scan at [facility].  -**labs before visit for 25 D, ca, P, alk phos and urine for UA and ca cr ratio.  # will send referral for spine Ortho to assess for other than osteoporosis and recommendaitons (braces, etc)  # pending that referral would like to be seen by Genetics to assess hypermobility, dysmorphologies, etc. As syndromic  # Need: at least 600 -800 IU vitamin D per day.and 1000 -1300 mg elemental calcium per day.( consider taking 500 mg Ca gummies or tums, eg)  (Each glass of milk has 100 D and 300 Calcium)    Thank you for choosing the Endocrinology Clinic at [facility].    Treating physician: Farooq Giles MD

## 2023-07-17 ENCOUNTER — ANCILLARY PROCEDURE (OUTPATIENT)
Dept: BONE DENSITY | Facility: CLINIC | Age: 28
End: 2023-07-17
Attending: STUDENT IN AN ORGANIZED HEALTH CARE EDUCATION/TRAINING PROGRAM
Payer: COMMERCIAL

## 2023-07-17 DIAGNOSIS — M81.0 OSTEOPOROSIS WITHOUT CURRENT PATHOLOGICAL FRACTURE, UNSPECIFIED OSTEOPOROSIS TYPE: ICD-10-CM

## 2023-07-17 PROCEDURE — 77080 DXA BONE DENSITY AXIAL: CPT | Performed by: INTERNAL MEDICINE

## 2023-07-29 DIAGNOSIS — F33.8 SEASONAL AFFECTIVE DISORDER (H): ICD-10-CM

## 2023-07-29 DIAGNOSIS — F41.9 ANXIETY: ICD-10-CM

## 2023-07-29 RX ORDER — DESVENLAFAXINE 25 MG/1
25 TABLET, EXTENDED RELEASE ORAL DAILY
Qty: 90 TABLET | Refills: 0 | OUTPATIENT
Start: 2023-07-29

## 2023-07-30 RX ORDER — DESVENLAFAXINE 25 MG/1
25 TABLET, EXTENDED RELEASE ORAL DAILY
Qty: 90 TABLET | Refills: 0 | Status: SHIPPED | OUTPATIENT
Start: 2023-07-30 | End: 2023-12-04

## 2023-09-22 ENCOUNTER — OFFICE VISIT (OUTPATIENT)
Dept: FAMILY MEDICINE | Facility: CLINIC | Age: 28
End: 2023-09-22
Payer: COMMERCIAL

## 2023-09-22 VITALS
DIASTOLIC BLOOD PRESSURE: 77 MMHG | TEMPERATURE: 97 F | WEIGHT: 181 LBS | SYSTOLIC BLOOD PRESSURE: 115 MMHG | OXYGEN SATURATION: 98 % | HEART RATE: 70 BPM | BODY MASS INDEX: 23.24 KG/M2 | RESPIRATION RATE: 20 BRPM

## 2023-09-22 DIAGNOSIS — R07.81 RIB PAIN ON LEFT SIDE: Primary | ICD-10-CM

## 2023-09-22 PROCEDURE — 99213 OFFICE O/P EST LOW 20 MIN: CPT | Mod: GC

## 2023-09-22 RX ORDER — IBUPROFEN 600 MG/1
600 TABLET, FILM COATED ORAL 3 TIMES DAILY
Qty: 90 TABLET | Refills: 0 | Status: SHIPPED | OUTPATIENT
Start: 2023-09-22 | End: 2023-10-22

## 2023-09-22 ASSESSMENT — ENCOUNTER SYMPTOMS
HEADACHES: 0
PALPITATIONS: 0
HEMATURIA: 0
HEMATOCHEZIA: 0
VOMITING: 0
SHORTNESS OF BREATH: 1
DIZZINESS: 0
BACK PAIN: 1
NAUSEA: 0

## 2023-09-22 NOTE — PROGRESS NOTES
Assessment & Plan     (R07.81) Rib pain on left side  (primary encounter diagnosis)  Comment: positional pain, feels a popping sensation in his ribs when he does certain movements. No hx of trauma. Has family history of hypermobile joints and wondering if this could be related. No diagnosis of Hammad danlos or marfan, had work up few years ago. Will trial PT and pain management with NSAIDs will follow up if no improvement.   Plan: ibuprofen (ADVIL/MOTRIN) 600 MG tablet,         Physical Therapy Referral              MARÍA Fagan, PGY2  [facility]    Favian Moore is a 28 year old, presenting for the following health issues:  Right sided rib pain and Recheck Medication        7/3/2023     9:31 AM   Additional Questions   Roomed by Ety   Accompanied by Self       HPI   Left Rib pain  -Stepped down form 1.5 ft from the ground and felt a pop  -Was not painful right away, later that night it hurt and has been brutal for 2 weeks  - 6/7 at first, 4-6 right now  -pin point, does not radiate  -Been affecting sleep and daily life  - Made worse by movement and sleep  -When leaning forward he hears and feels it pops and it is uncomfortable but not painful  -Ibuprofen sort of helps  - pain relief patches are not rally helping, tried Voltaren gel  -Massage does not help  Work: Mental health therapist     -Really low bone density at 19, was not eating enough, really low Vitamin D and calcium  -Interested in prescription for these, not taking them now but probably should in winter  -DEXA scan last year showed good bone density  - Was referred to endocrinologist for follow-up in December  - Thinks his issues were diet, because his bone density was good          Review of Systems   Cardiovascular:  Negative for chest pain, palpitations and peripheral edema.   Gastrointestinal:  Negative for hematochezia, nausea and vomiting.   Genitourinary:  Negative for hematuria.   Musculoskeletal:   Positive for back pain.   Neurological:  Negative for dizziness and headaches.            Objective    /77   Pulse 70   Temp 97  F (36.1  C) (Tympanic)   Resp 20   Wt 82.1 kg (181 lb)   SpO2 98%   BMI 23.24 kg/m    Body mass index is 23.24 kg/m .  Physical Exam   GENERAL: healthy, alert and no distress  NECK: no adenopathy, no asymmetry, masses, or scars and thyroid normal to palpation  RESP: lungs clear to auscultation, tenderness on left upper ribs - no rales, rhonchi or wheezes  CV: regular rate and rhythm, normal S1 S2, no S3 or S4, no murmur, click or rub, no peripheral edema and peripheral pulses strong  ABDOMEN: soft, nontender, no hepatosplenomegaly, no masses and bowel sounds normal  MS: no gross musculoskeletal defects noted, no edema

## 2023-09-22 NOTE — PROGRESS NOTES
{PROVIDER CHARTING PREFERENCE:374262}    Subjective   Oscar is a 28 year old, presenting for the following health issues:  Results (Ct), Flank Pain (Left side ), and Recheck Medication  {(!) Visit Details have not yet been documented.  Please enter Visit Details and then use this list to pull in documentation. (Optional):[id number]}    HPI   Left Rib pain  -Stepped down form 1.5 ft from the ground and felt a pop  -Was not painful right away, later that night it hurt and has been brutal for 2 weeks  - 6/7 at first, 4-6 right now  -pin point, does not radiate  -Been affecting sleep and daily life  - Made worse by movement and sleep  -When leaning forward he hears and feels it pops and it is uncomfortable but not painful  -Ibuprofen sort of helps. 400mg at night  - pain relief patches are not rally helping, tried Voltaren gel  -Massage does not help    -Really low bone density at 19, was not eating enough, really low Vitamin D and calcium  -Interested in prescription for these, not taking them now but probably should in winter  -DEXA scan last year showed good bone density  - Was referred to endocrinologist for follow-up in December  - Thinks his issues were diet, because his bone density was good    Endorses  -Muscle weakness  -Breathing painful  -Taking shallow breaths  -Skin is itchy, like nerve is firing, numbness and tingling in spot  -Inside feels pinched   -Feels like heart rate is increased      Work: Mental health therapist     PMHx:  - Shoulder subluxation  -     Fam Hx  - Tested for Hammad Danlos (-) and Marfans  - Dad has hypermobility      Review of Systems   Respiratory:  Positive for shortness of breath.    Cardiovascular:  Negative for chest pain, palpitations and peripheral edema.   Gastrointestinal:  Negative for hematochezia, nausea and vomiting.   Genitourinary:  Negative for hematuria.   Musculoskeletal:  Positive for back pain.   Neurological:  Negative for dizziness and headaches.           Objective    /77   Pulse 70   Temp 97  F (36.1  C) (Tympanic)   Resp 20   Wt 82.1 kg (181 lb)   SpO2 98%   BMI 23.24 kg/m    Body mass index is 23.24 kg/m .  Physical Exam   {Exam List (Optional):429217}    {Diagnostic Test Results (Optional):281558}    {AMBULATORY ATTESTATION (Optional):263518}

## 2023-10-06 ENCOUNTER — OFFICE VISIT (OUTPATIENT)
Dept: FAMILY MEDICINE | Facility: CLINIC | Age: 28
End: 2023-10-06
Payer: COMMERCIAL

## 2023-10-06 VITALS
RESPIRATION RATE: 16 BRPM | BODY MASS INDEX: 20.21 KG/M2 | WEIGHT: 157.41 LBS | OXYGEN SATURATION: 98 % | HEART RATE: 57 BPM | SYSTOLIC BLOOD PRESSURE: 110 MMHG | TEMPERATURE: 98.3 F | DIASTOLIC BLOOD PRESSURE: 73 MMHG

## 2023-10-06 DIAGNOSIS — R07.81 RIB PAIN ON LEFT SIDE: Primary | ICD-10-CM

## 2023-10-06 PROBLEM — E46 PROTEIN-CALORIE MALNUTRITION (H): Status: ACTIVE | Noted: 2023-10-06

## 2023-10-06 PROCEDURE — 99213 OFFICE O/P EST LOW 20 MIN: CPT | Mod: GC

## 2023-10-06 ASSESSMENT — PAIN SCALES - GENERAL: PAINLEVEL: MODERATE PAIN (4)

## 2023-10-06 NOTE — PROGRESS NOTES
Assessment & Plan     Rib pain on left side  Here for follow up after a visit for left rib pain and subluxation. PMH notable for hypermobility syndrome negative for Ehler's and Marfan's syndrome, and bilateral shoulder subluxations. Oscar reports the pain is better than his previous visit, but  to pressure and movement. He asked if this pain would be chronic, and we discussed that we hope the pain will subside with physical therapy. He was also looking for more information about his hypermobility and to better understand his body limitations, which physical therapy should be able to help with.   - Physical Therapy Referral        Subjective   Oscar is a 28 year old, presenting for the following health issues:  RECHECK (Left rib dislocation )      HPI   - subluxation of rib, following up to see how to manage hypermobility   - taking 600 mg ibuprofen 3x a time, 14 days, occasionally 400mg 2x max  - pain 3-4/10, uncomfortable   - pain is a few points better, less acute than previous visit   - pain makes it hard to sleep, wants to know if it'll be chronic   - occasionally feels the popping  - no one from physical therapy has called   - questions about hypermobility and if there are any restrictions with exercises he can do    - happened all the time as a kid  - rotator cuff tear in the last 2 years, no surgery   - weight strengthening, bouldering, rock-climbing exercises, but mostly swimming primarily the last 1.5 yrs  - history of recurrent shoulder subluxations, no recent injury   - no chest pain with exertion, no palpitations    Review of Systems   Constitutional, HEENT, cardiovascular, pulmonary, gi and gu systems are negative, except as otherwise noted.      Objective    /73 (BP Location: Right arm, Patient Position: Sitting, Cuff Size: Adult Regular)   Pulse 57   Temp 98.3  F (36.8  C) (Axillary)   Resp 16   Wt 71.4 kg (157 lb 6.5 oz)   SpO2 98%   BMI 20.21 kg/m    Body mass index is  20.21 kg/m .  Physical Exam   GENERAL: healthy, alert and no distress  RESP: lungs clear to auscultation - no rales, rhonchi or wheezes  CV: regular rate and rhythm, normal S1 S2, no peripheral edema  MS: no gross musculoskeletal defects noted, no edema, hypermobile joints, localized tenderness over left ribs, tender to pressure and movement             Ephraim Fofana, MS3  [employer]     Resident attestation   I was present with the medical student who participated in the service and in the documentation of this note. I have verified the history and personally performed the physical exam and medical decision making, and have verified the content of the note, which accurately reflects my assessment of the patient and the plan of care.   MARÍA Fagan  [facility] PGY2

## 2023-10-06 NOTE — CONFIDENTIAL NOTE
Interpersonal Safety (Abuse) Screening Follow Up    Interpersonal Safety Screen  Do you feel physically and emotionally safe where you currently live?: No  Within the past 12 months, have you been hit, slapped, kicked or otherwise physically hurt by someone?: No  Within the past 12 months, have you been humiliated or emotionally abused in other ways by your partner or ex-partner?: No    Summary of concern: patient states that he has no safety concerns. Lives with wife.     Follow Up  Follow up if needed.

## 2023-10-06 NOTE — PROGRESS NOTES
Preceptor Attestation:  Patient's case reviewed and discussed with Rohan Mesa MD resident and I evaluated the patient. I agree with written assessment and plan of care.  Supervising Physician:  AVA OLIVEIRA MD  [facility]

## 2023-10-09 NOTE — PROGRESS NOTES
Preceptor Attestation:  Patient's case reviewed and discussed with the resident, Rohan Mesa MD, and I personally evaluated the patient. I agree with written assessment and plan of care.    Supervising Physician:  Marina Askew MD   [facility]

## 2023-12-04 ENCOUNTER — OFFICE VISIT (OUTPATIENT)
Dept: ENDOCRINOLOGY | Facility: CLINIC | Age: 28
End: 2023-12-04
Payer: COMMERCIAL

## 2023-12-04 ENCOUNTER — LAB (OUTPATIENT)
Dept: LAB | Facility: CLINIC | Age: 28
End: 2023-12-04
Payer: COMMERCIAL

## 2023-12-04 VITALS
WEIGHT: 157 LBS | BODY MASS INDEX: 19.52 KG/M2 | DIASTOLIC BLOOD PRESSURE: 72 MMHG | HEART RATE: 90 BPM | OXYGEN SATURATION: 99 % | HEIGHT: 75 IN | SYSTOLIC BLOOD PRESSURE: 112 MMHG

## 2023-12-04 DIAGNOSIS — M81.0 OSTEOPOROSIS WITHOUT CURRENT PATHOLOGICAL FRACTURE, UNSPECIFIED OSTEOPOROSIS TYPE: ICD-10-CM

## 2023-12-04 DIAGNOSIS — M25.50 HYPERMOBILITY ARTHRALGIA: ICD-10-CM

## 2023-12-04 DIAGNOSIS — M25.50 MULTIPLE JOINT PAIN: Primary | ICD-10-CM

## 2023-12-04 LAB
ALBUMIN SERPL BCG-MCNC: 4.7 G/DL (ref 3.5–5.2)
ALP SERPL-CCNC: 61 U/L (ref 40–150)
ALT SERPL W P-5'-P-CCNC: 20 U/L (ref 0–70)
ANION GAP SERPL CALCULATED.3IONS-SCNC: 11 MMOL/L (ref 7–15)
AST SERPL W P-5'-P-CCNC: 19 U/L (ref 0–45)
BILIRUB SERPL-MCNC: 0.2 MG/DL
BUN SERPL-MCNC: 13.8 MG/DL (ref 6–20)
CALCIUM SERPL-MCNC: 9.8 MG/DL (ref 8.6–10)
CHLORIDE SERPL-SCNC: 102 MMOL/L (ref 98–107)
CREAT SERPL-MCNC: 0.8 MG/DL (ref 0.67–1.17)
DEPRECATED HCO3 PLAS-SCNC: 26 MMOL/L (ref 22–29)
EGFRCR SERPLBLD CKD-EPI 2021: >90 ML/MIN/1.73M2
GLUCOSE SERPL-MCNC: 106 MG/DL (ref 70–99)
MAGNESIUM SERPL-MCNC: 2.4 MG/DL (ref 1.7–2.3)
PHOSPHATE SERPL-MCNC: 3.7 MG/DL (ref 2.5–4.5)
POTASSIUM SERPL-SCNC: 3.8 MMOL/L (ref 3.4–5.3)
PROT SERPL-MCNC: 8.1 G/DL (ref 6.4–8.3)
PTH-INTACT SERPL-MCNC: 19 PG/ML (ref 15–65)
SODIUM SERPL-SCNC: 139 MMOL/L (ref 135–145)
VIT D+METAB SERPL-MCNC: 25 NG/ML (ref 20–50)

## 2023-12-04 PROCEDURE — 83735 ASSAY OF MAGNESIUM: CPT | Performed by: PATHOLOGY

## 2023-12-04 PROCEDURE — 36415 COLL VENOUS BLD VENIPUNCTURE: CPT | Performed by: PATHOLOGY

## 2023-12-04 PROCEDURE — 82306 VITAMIN D 25 HYDROXY: CPT | Performed by: INTERNAL MEDICINE

## 2023-12-04 PROCEDURE — 83970 ASSAY OF PARATHORMONE: CPT | Performed by: PATHOLOGY

## 2023-12-04 PROCEDURE — 99000 SPECIMEN HANDLING OFFICE-LAB: CPT | Performed by: PATHOLOGY

## 2023-12-04 PROCEDURE — 82652 VIT D 1 25-DIHYDROXY: CPT | Performed by: INTERNAL MEDICINE

## 2023-12-04 PROCEDURE — 84100 ASSAY OF PHOSPHORUS: CPT | Performed by: PATHOLOGY

## 2023-12-04 PROCEDURE — 99204 OFFICE O/P NEW MOD 45 MIN: CPT | Mod: GC | Performed by: INTERNAL MEDICINE

## 2023-12-04 PROCEDURE — 80053 COMPREHEN METABOLIC PANEL: CPT | Performed by: PATHOLOGY

## 2023-12-04 ASSESSMENT — PAIN SCALES - GENERAL: PAINLEVEL: MILD PAIN (3)

## 2023-12-04 NOTE — LETTER
12/4/2023       RE: Oscar Newby  [address]  [facility] [address]     Dear Colleague,    Thank you for referring your patient, Oscar Newby, to the [facility] ENDO[facility] [place] at [facility]. Please see a copy of my visit note below.    Assessment & Plan     Osteoporosis without current pathological fracture, unspecified osteoporosis type    Assessment:  Oscar is a 28 year old man with osteoporosis with spinal compression fractures, several joint subluxations, hypermobility, and history of vitamin D deficiency who presents for further evaluation of osteoporosis. He saw an outside endocrinologist for this several years ago. Thorough lab workup at that time showed low vitamin D and was unremarkable for other endocrine causes. While most recent DEXA can't be directly compared to prior scans, it seems BMD has improved with Z-scores in the normal range after 6-7 years of consistent vitamin D supplementation. However, will repeat labs today to revaluate for potential causes given his young age. In addition, his hypermobility and joint subluxations, along with his notable family history, may be indicative of an underlying CTD, which could contribute to decreased bone density. Recommend rheumatology referral for further evaluation of this.    Plan:  - Labs today  - Restart vitamin D supplement, pending lab results  Orders Placed This Encounter   Procedures    Comprehensive metabolic panel    Vitamin D Deficiency (D3 Only)    1,25 Dihydroxyvitamin D    Phosphorus    Magnesium    Parathyroid Hormone Intact   - Rheumatology referral for joint pain and history of subluxations      Elle Olmos, MS3  [facility] [employer]                                                                                                                                                                                 Chief complaint: Oscar is a 28 year  old male seen in consultation for osteoporosis.     HPI  History of osteoporosis, diagnosed by low bone density on DEXA + compression fractures.    First noticed back pain while bungee jumping in 2015. Then had another episode of back pain following skiing accident in December 2015. Spinal MRI in 2016 showed chronic thoracic and lumbar compression fractures. He was then referred to Dr. Giles in endocrinology at [facility], who he saw for the first time in September 2016. DEXA on 10/7/2016 (at [facility]) showed L1-L4 Z-score of -2.2. Thorough lab work-up at this time showed low vitamin D but was otherwise normal for other endocrine causes.    He started prescription-strength vitamin D supplementation, believes he was initially taking 50,000 units daily. Transitioned to an OTC dose at some point, which he was taking daily until about 6 months ago.    Most recent DEXA from 7/17/2023 demonstrated normal bone density; Z-score lumbar spine -1.6, left total femur -1.5, right total femur -1.0.    Besides the thoracic/lumbar compression fractures, he has not had any new fractures. Notably, he has a personal and family history of joint hypermobility and skin laxity, as wel as history of right shoulder subluxation a couple years ago while rock climbing and left rib subluxation a couple months ago after stepping down from a height of 1.5 feet. He saw PT in the past for the shoulder injury and continues seeing them currently for the rib injury. Both parents and several other family members with joint hypermobility, some with skin laxity as well. He has not been formally evaluated for CTD. To his knowledge, neither has anyone in his family. No family history of spinal compression fractures or other fractures.    Fractures: Thoracic/lumbar compression fractures, toe fracture in middle school  Teeth: No teeth defects  Dietary calcium: 1 serving per day  Tobacco use: No  Alcohol: 2 beers per week  Kidney stones:  No  Diet: 2-2.5 meals per day, typically consists of meat, fish, vegetables, carbs. Feels like diet has been fairly healthy throughout his life. Besides the vitamin D deficiency, denies any known malnutrition or period of under-eating.  Exercise: Activity has been limited by recent injuries but was previously quite active, enjoys swimming and rock climbing  Malabsorption: Negative anti-TTG and anti-gliadin in 2016. No history of IBD. Denies diarrhea or constipation.   Family history: Several family members with hypermobility but no one with a formal diagnosis, no kidney stones, no fractures, no tooth defects    Relevant labs and/or imaging are as follows:   Latest Reference Range & Units 01/11/22 15:14 07/03/23 10:26   Vitamin D Deficiency screening 20 - 75 ug/L 88 (H) 34      Latest Reference Range & Units 01/11/18 16:42 10/16/19 13:58   Vitamin D, Total (25-Hydroxy) 30.0 - 80.0 ng/mL 26.0 (L)  26.0 (L) 18.6 (L)      Latest Reference Range & Units 07/03/23 10:26   Calcium 8.6 - 10.0 mg/dL 9.6     9/27/2016  25-hydroxyvitamin D 25  Ionized calcium 5.0  PTHi 19  Cortisol 12  Prolactin 6.9  IGF-1 189  GH 0.8  FSH 2.5  LH 1  Testosterone 480  TSH 1.06  Calcium, Urine Random 21.5   Creatinine,Urine Random 310   CA/creat ratio 0.07     3/22/2017  Phosphorus 4.1    MR thoracic/lumbar spine 5/31/2016  Impression:   1.  Chronic T4, T5, and L1 compression fractures.  2.  Minimal vertebral body height loss at T6 and T7.  3.  No evidence for acute thoracic spine fracture or ligament injury.    DEXA 7/17/2023  Impression  BMD is within the expected range for age based on WHO criteria Ref. 1     Results   Lumbar spine   Z-score -1.6 (L1-4), BMD is 1.015 g/cm2     Left femoral neck  Z-score -1.2     Right femoral neck  Z-score -0.8     Left total femur  Z-score -1.5 , BMD is 0.873 g/cm2     Right total femur  Z-score -1.0, BMD is 0.944 g/cm2     Interval change  No prior study available for comparison.     Fracture risk  "  Fracture risk calculation is not indicated. Ref.4  ROS  10 system ROS as per the HPI or negative      Past Medical History  Past Medical History:   Diagnosis Date    Hypermobility of joint     Osteoporosis     Seasonal affective disorder (H24)    GERD  Right shoulder subluxation, ~2 years ago  Left rib subluxation, September 2023  Vitamin D deficiency    Medications  Current Outpatient Medications   Medication Sig Dispense Refill    L-Theanine 200 MG CAPS       MAGNESIUM GLYCINATE ORAL Take 400 mg by mouth daily      Vitamin D, Cholecalciferol, 25 MCG (1000 UT) CAPS Pt is taking 15411 units per day (Patient not taking: Reported on 7/3/2023)         Allergies  No Known Allergies    Family History  family history includes Anxiety Disorder in his brother; Hypertension in an other family member; No Known Problems in his brother, father, and mother; Other - See Comments in an other family member.    Social History  Social History     Tobacco Use    Smoking status: Never     Passive exposure: Never    Smokeless tobacco: Never   Vaping Use    Vaping Use: Never used   Substance Use Topics    Alcohol use: Yes     Alcohol/week: 2.0 standard drinks of alcohol     Types: 2 Cans of beer per week    Drug use: Not Currently       Physical Exam  /72   Pulse 90   Ht 1.905 m (6' 3\")   Wt 71.2 kg (157 lb)   SpO2 99%   BMI 19.62 kg/m    Wt Readings from Last 5 Encounters:   12/04/23 71.2 kg (157 lb)   10/06/23 71.4 kg (157 lb 6.5 oz)   09/22/23 82.1 kg (181 lb)   07/03/23 74 kg (163 lb 0.6 oz)   03/08/22 74.8 kg (165 lb)     Body mass index is 19.62 kg/m .    GENERAL: NAD  SKIN: Normal color, no rashes or lesion appreciated on exposed skin   EYES: EOMI, no scleral icterus  NECK: No visible masses  RESP: No respiratory distress, normal effort  CARDIO: Regular rate, appears well perfused      NEURO: Awake, alert, responds appropriately to questions   EXTREMITIES: No edema      Attestation signed by Elfego Boyd MD at " 1/5/2024  8:54 AM:  Physician Attestation  I, Elfego Boyd MD, saw this patient virtually with medical student, and agree with the findings and plan of care as documented in the note.      Items personally reviewed/procedural attestation: vitals, labs, and imaging and agree with the interpretation documented in the note.    In summary: 28M with pmh of low BMD for age, joint subluxations here for re-evaluation for potential metabolic bone endocrinopathy.  Previous endocrinopathy found vitamin D deficiency but no other identifiable etiology.  No strong clinical signs of hyperparathyroidism.     Plan to recheck labs for endocrine related metabolic bone disorders.  BMD has likely improved (DEXAs were done on different machines) with vitamin D supplementation however.  Would continue this.  If no endocrinopathy is found, will place rheum referral for joint subluxation evaluation.     GENERAL :  In no apparent distress  SKIN: Normal color, normal temperature     EYES: EOMI, No scleral icterus  NECK: No visible masses  RESP: No respiratory distress, normal effort  CARDIO: regular rate  ABDOMEN: flat, nondistended       NEURO: awake, alert, responds appropriately to questions   EXTREMITIES: No clubbing, cyanosis or edema.       I personally spent 45 minutes on the day of this new patient visit independently reviewing chart, labs, seeing the patient and explaining plan of care.  This time was independent of time spent on teaching activities.     Elfego Boyd MD

## 2023-12-04 NOTE — PROGRESS NOTES
Assessment & Plan     Osteoporosis without current pathological fracture, unspecified osteoporosis type    Assessment:  Oscar is a 28 year old man with osteoporosis with spinal compression fractures, several joint subluxations, hypermobility, and history of vitamin D deficiency who presents for further evaluation of osteoporosis. He saw an outside endocrinologist for this several years ago. Thorough lab workup at that time showed low vitamin D and was unremarkable for other endocrine causes. While most recent DEXA can't be directly compared to prior scans, it seems BMD has improved with Z-scores in the normal range after 6-7 years of consistent vitamin D supplementation. However, will repeat labs today to revaluate for potential causes given his young age. In addition, his hypermobility and joint subluxations, along with his notable family history, may be indicative of an underlying CTD, which could contribute to decreased bone density. Recommend rheumatology referral for further evaluation of this.    Plan:  - Labs today  - Restart vitamin D supplement, pending lab results  Orders Placed This Encounter   Procedures    Comprehensive metabolic panel    Vitamin D Deficiency (D3 Only)    1,25 Dihydroxyvitamin D    Phosphorus    Magnesium    Parathyroid Hormone Intact   - Rheumatology referral for joint pain and history of subluxations      Elle Olmos, MS3  [facility] [employer]                                                                                                                                                                                 Chief complaint: Oscar is a 28 year old male seen in consultation for osteoporosis.     HPI  History of osteoporosis, diagnosed by low bone density on DEXA + compression fractures.    First noticed back pain while bungee jumping in 2015. Then had another episode of back pain following skiing accident in December 2015. Spinal MRI in 2016 showed  chronic thoracic and lumbar compression fractures. He was then referred to Dr. Giles in endocrinology at [facility], who he saw for the first time in September 2016. DEXA on 10/7/2016 (at [facility]) showed L1-L4 Z-score of -2.2. Thorough lab work-up at this time showed low vitamin D but was otherwise normal for other endocrine causes.    He started prescription-strength vitamin D supplementation, believes he was initially taking 50,000 units daily. Transitioned to an OTC dose at some point, which he was taking daily until about 6 months ago.    Most recent DEXA from 7/17/2023 demonstrated normal bone density; Z-score lumbar spine -1.6, left total femur -1.5, right total femur -1.0.    Besides the thoracic/lumbar compression fractures, he has not had any new fractures. Notably, he has a personal and family history of joint hypermobility and skin laxity, as wel as history of right shoulder subluxation a couple years ago while rock climbing and left rib subluxation a couple months ago after stepping down from a height of 1.5 feet. He saw PT in the past for the shoulder injury and continues seeing them currently for the rib injury. Both parents and several other family members with joint hypermobility, some with skin laxity as well. He has not been formally evaluated for CTD. To his knowledge, neither has anyone in his family. No family history of spinal compression fractures or other fractures.    Fractures: Thoracic/lumbar compression fractures, toe fracture in middle school  Teeth: No teeth defects  Dietary calcium: 1 serving per day  Tobacco use: No  Alcohol: 2 beers per week  Kidney stones: No  Diet: 2-2.5 meals per day, typically consists of meat, fish, vegetables, carbs. Feels like diet has been fairly healthy throughout his life. Besides the vitamin D deficiency, denies any known malnutrition or period of under-eating.  Exercise: Activity has been limited by recent injuries but was previously  quite active, enjoys swimming and rock climbing  Malabsorption: Negative anti-TTG and anti-gliadin in 2016. No history of IBD. Denies diarrhea or constipation.   Family history: Several family members with hypermobility but no one with a formal diagnosis, no kidney stones, no fractures, no tooth defects    Relevant labs and/or imaging are as follows:   Latest Reference Range & Units 01/11/22 15:14 07/03/23 10:26   Vitamin D Deficiency screening 20 - 75 ug/L 88 (H) 34      Latest Reference Range & Units 01/11/18 16:42 10/16/19 13:58   Vitamin D, Total (25-Hydroxy) 30.0 - 80.0 ng/mL 26.0 (L)  26.0 (L) 18.6 (L)      Latest Reference Range & Units 07/03/23 10:26   Calcium 8.6 - 10.0 mg/dL 9.6     9/27/2016  25-hydroxyvitamin D 25  Ionized calcium 5.0  PTHi 19  Cortisol 12  Prolactin 6.9  IGF-1 189  GH 0.8  FSH 2.5  LH 1  Testosterone 480  TSH 1.06  Calcium, Urine Random 21.5   Creatinine,Urine Random 310   CA/creat ratio 0.07     3/22/2017  Phosphorus 4.1    MR thoracic/lumbar spine 5/31/2016  Impression:   1.  Chronic T4, T5, and L1 compression fractures.  2.  Minimal vertebral body height loss at T6 and T7.  3.  No evidence for acute thoracic spine fracture or ligament injury.    DEXA 7/17/2023  Impression  BMD is within the expected range for age based on WHO criteria Ref. 1     Results   Lumbar spine   Z-score -1.6 (L1-4), BMD is 1.015 g/cm2     Left femoral neck  Z-score -1.2     Right femoral neck  Z-score -0.8     Left total femur  Z-score -1.5 , BMD is 0.873 g/cm2     Right total femur  Z-score -1.0, BMD is 0.944 g/cm2     Interval change  No prior study available for comparison.     Fracture risk   Fracture risk calculation is not indicated. Ref.4  ROS  10 system ROS as per the HPI or negative      Past Medical History  Past Medical History:   Diagnosis Date    Hypermobility of joint     Osteoporosis     Seasonal affective disorder (H24)    GERD  Right shoulder subluxation, ~2 years ago  Left rib subluxation,  "September 2023  Vitamin D deficiency    Medications  Current Outpatient Medications   Medication Sig Dispense Refill    L-Theanine 200 MG CAPS       MAGNESIUM GLYCINATE ORAL Take 400 mg by mouth daily      Vitamin D, Cholecalciferol, 25 MCG (1000 UT) CAPS Pt is taking 63001 units per day (Patient not taking: Reported on 7/3/2023)         Allergies  No Known Allergies    Family History  family history includes Anxiety Disorder in his brother; Hypertension in an other family member; No Known Problems in his brother, father, and mother; Other - See Comments in an other family member.    Social History  Social History     Tobacco Use    Smoking status: Never     Passive exposure: Never    Smokeless tobacco: Never   Vaping Use    Vaping Use: Never used   Substance Use Topics    Alcohol use: Yes     Alcohol/week: 2.0 standard drinks of alcohol     Types: 2 Cans of beer per week    Drug use: Not Currently       Physical Exam  /72   Pulse 90   Ht 1.905 m (6' 3\")   Wt 71.2 kg (157 lb)   SpO2 99%   BMI 19.62 kg/m    Wt Readings from Last 5 Encounters:   12/04/23 71.2 kg (157 lb)   10/06/23 71.4 kg (157 lb 6.5 oz)   09/22/23 82.1 kg (181 lb)   07/03/23 74 kg (163 lb 0.6 oz)   03/08/22 74.8 kg (165 lb)     Body mass index is 19.62 kg/m .    GENERAL: NAD  SKIN: Normal color, no rashes or lesion appreciated on exposed skin   EYES: EOMI, no scleral icterus  NECK: No visible masses  RESP: No respiratory distress, normal effort  CARDIO: Regular rate, appears well perfused      NEURO: Awake, alert, responds appropriately to questions   EXTREMITIES: No edema    "

## 2023-12-06 LAB — 1,25(OH)2D SERPL-MCNC: 37.4 PG/ML (ref 19.9–79.3)

## 2024-01-09 PROBLEM — Z86.39 HISTORY OF VITAMIN D DEFICIENCY: Status: ACTIVE | Noted: 2024-01-09

## 2024-01-09 PROBLEM — M81.0 OSTEOPOROSIS WITHOUT CURRENT PATHOLOGICAL FRACTURE, UNSPECIFIED OSTEOPOROSIS TYPE: Status: ACTIVE | Noted: 2023-07-03

## 2024-01-09 PROBLEM — E55.9 VITAMIN D DEFICIENCY: Status: RESOLVED | Noted: 2017-03-22 | Resolved: 2024-01-09

## 2024-04-23 ENCOUNTER — VIRTUAL VISIT (OUTPATIENT)
Dept: FAMILY MEDICINE | Facility: CLINIC | Age: 29
End: 2024-04-23
Payer: COMMERCIAL

## 2024-04-23 DIAGNOSIS — G47.9 SLEEP DISORDER: Primary | ICD-10-CM

## 2024-04-23 PROCEDURE — 99213 OFFICE O/P EST LOW 20 MIN: CPT | Mod: 95 | Performed by: INTERNAL MEDICINE

## 2024-04-23 NOTE — PROGRESS NOTES
"Oscar is a 28 year old who is being evaluated via a billable video visit.    How would you like to obtain your AVS? MyChart  If the video visit is dropped, the invitation should be resent by: Text to cell phone: [phone]  Will anyone else be joining your video visit? No    Assessment & Plan     Sleep disorder  Possibly a REM sleep disorder.   Referred to Sleep Med  - Adult Sleep Eval & Management  Referral; Future    See Patient Instructions    Subjective   Oscar is a 28 year old, presenting for the following health issues:  Diabetes    History of Present Illness       Reason for visit:  Sleep difficulties  Symptom onset:  More than a month  Symptoms include:  I often wake up and have racing thoughts. Can't seem to sort it out with mental health. I'm not sure if something else is going on.  Symptom intensity:  Moderate  Symptom progression:  Staying the same  Had these symptoms before:  Yes    He eats 0-1 servings of fruits and vegetables daily.He consumes 0 sweetened beverage(s) daily.He exercises with enough effort to increase his heart rate 9 or less minutes per day.  He exercises with enough effort to increase his heart rate 3 or less days per week.   He is taking medications regularly.       Oscar is here virtually to talk about some concerns.  Sleep disturbance: while he is falling asleep, he becomes anxious and has random thoughts, he wakes up middle of the night, he is stuck in anxious thoughts that could be a dream, he calls them \"abstract\" thoughts, he has to wake up, walk around, to get out of these thoughts.   He has had this problem for years.   This happens about 2 times a week.  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         Objective         Vitals:  No vitals were obtained today due to virtual visit.    Physical Exam   GEN: No acute distress  RESP: No audible increased work of breathing. Patient speaking in full sentences without distress.  PSYCH: pleasant  Exam otherwise limited due to " virtual platform        Video-Visit Details    Type of service:  Video Visit   Originating Location (pt. Location): Home  Distant Location (provider location):  Off-site  Platform used for Video Visit: Jerry  Signed Electronically by: Candie Parks MD

## 2024-07-14 ENCOUNTER — HEALTH MAINTENANCE LETTER (OUTPATIENT)
Age: 29
End: 2024-07-14

## 2024-08-15 NOTE — PROGRESS NOTES
Outpatient Sleep Medicine Consultation:      Name: Oscar Newby MRN# [id number]   Age: 29 year old YOB: 1995     Date of Consultation: August 15, 2024  Consultation is requested by: Candie Parks MD  [address] [id number] Candie Parks  Primary care provider: Lydia Ngo       Reason for Sleep Consult:     Oscar Newby is sent by Candie Parks for a sleep consultation regarding sleep disorder.    Patient s Reason for visit  Oscar Newby main reason for visit:    Patient states problem(s) started:    Oscar Newby's goals for this visit:             Assessment and Plan:     Summary Sleep Diagnoses and Recommendations:  (G47.00) Insomnia, unspecified type  (primary encounter diagnosis)  Comment: Oscar presents with a chief concern of waking in the middle the night and having intrusive thoughts.  He says it feels like he is maybe partially asleep.  He has some control over where his thoughts go, but sometimes his thoughts will go to random places, not necessarily worrisome.  It may take an hour for him to get back to sleep.  He has been on citalopram for anxiety/seasonal affective disorder in the past, and it made the intrusive thoughts worse.  He has also been on desvenlafaxine, which did not seem to affect this issue.  He feels he wakes an average of 5-10 times per night, mostly related to pain.  He has hypermobility syndrome that contributes to joint pain.  His Oura ring states he is awake on average 1 hour per night out of 8 in bed.  His sleep schedule is potentially excessive.  His bedtime is 10:30 PM-11:30 PM and wake time is 7:45 AM-8 AM.  He has tried over-the-counter supplements without significant benefit.  He is not told that he snores or has pauses in breathing, but hypermobility syndromes do carry an increased risk of apnea.  He denies daytime sleepiness as well.  His BMI is 20.  Overall his risk for KASSANDRA sounds quite low.  He has infrequent restless legs symptoms  related to working out.  He does not feel that is interfering with his sleep significantly.  Plan: We will try to reduce his awakenings via sleep compression.  I recommended he try to use his Oura ring to try to time his morning alarm with the end of REM sleep and set his bedtime 8 hours before that.  He may push his bedtime later 15-30 minutes every couple of weeks until he is sleeping more consistently.  I will also have him use the SnSenior Moments Anand to gather more feedback regarding his breathing and potential snoring.  If he identifies any irregularities in his breathing, we will pursue a sleep study.  If these efforts are not effective, we may consider a trial of gabapentin to see if that helps reduce pain, anxiety and awakenings.       Comorbid Diagnoses:  Patient Active Problem List   Diagnosis    Hypermobility syndrome     Esophageal reflux    Seasonal affective disorder (H24)    Chronic midline low back pain without sciatica    Protein-calorie malnutrition (H)    History of vitamin D deficiency        Summary Counseling:    Sleep Testing Reviewed  Obstructive Sleep Apnea Reviewed  Complications of Untreated Sleep Apnea Reviewed      Patient will follow up in 6 months (my next available). He was encouraged to communicate via [employer] until then.  Bennett Goltz, PA-C      Total time spent reviewing medical records, history and physical examination, review of previous testing and interpretation as well as documentation on this date:65 min    CC: Candie Parks          History of Present Illness:     Oscar Newby presents with concerns of waking up and then he gets racing mind and intrusive thoughts. He has not been able to find an effective thought stopping technique. It takes about an hour for it to resolve. It can occur weekly to monthly, recently 2-3 times per month. He feels he is half awake. He feels his thoughts are fluid like in dreams, but he can direct his thoughts at some times. The thoughts are random  "and not necessarily worrisome. He has had this problem for years.     Notes from his chart in 2021 state: \"He started citalopram 10 mg taking it in the mid morning but then started waking up with severe anxiety at 4 am and felt very hot -like brain on fire. He stopped the medicine and no further issues with waking up after a few days. He thinks he was having side effects. He took citalopram last year during the winter and did well.\"   He was seeing a therapist at that time. He was watching chess videos before bed and thinks that was triggering them. He does still use his phone in bed before sleep.   He has tried desvenlafaxine after citalopram. He took it mostly in the winter. He does not recall if affecting his sleep.   He does not notice if the thought issue fluctuates by season.    He has a hypermobility syndrome, running in his family. He has to exercise to help keep his joints in place, but then is more prone to injury from exercise. That interferes with sleep. He has compression fractures in his spine from age 19. Back pain may interfere with sleep slightly. He has subluxed ribs and shoulder and will get short of sleep as a result.     His Oura ring says his average O2 sat is 96%. It does not seem to track O2 variability.    Past Sleep Evaluations: none    SLEEP-WAKE SCHEDULE:     Work/School Days: Patient goes to school/work: Yes   Usually gets into bed at 10:30-11:30 PM  Takes patient about 25 min to fall asleep, per his Oura ring  Has trouble falling asleep 1 nights per week  Wakes up in the middle of the night 5-10 times.  Wakes up due to   pain/stiffness  He has trouble falling back asleep  0 times a week.   It usually takes  5 min to get back to sleep, but he may be in light sleep for a while. His Oura ring states he is awake for 1 hour out of 8 in bed.   Patient is usually up at  7:45-8 AM. He has room darkening curtains, but he can see light coming through the cracks in the door and he feels he is in " light sleep through the morning.  Uses alarm:   yes    Weekends/Non-work Days/All Other Days:  May be a little later, playing video games. Generally not up past 12:15 AM.     Sleep Need  Patient gets  7 hours  sleep on average   Patient thinks he needs about  8 hours sleep    Oscar Newby prefers to sleep in this position(s):   back since his subluxed rib.   Patient states they do the following activities in bed:   On phone sometimes.  He has tried sleep mask but they fall off. He gets dry eyes and his eyes are partially open. He does benefit from ear plugs.     Naps  Patient takes a purposeful nap 0  times a week and naps are usually   in duration  He feels better after a nap:    He dozes off unintentionally 0  days per week  Patient has had a driving accident or near-miss due to sleepiness/drowsiness:  no. He does have some automatic behaviors, gets to work and was not really paying attention to how he got there.       SLEEP DISRUPTIONS:    Breathing/Snoring  Patient snores:  no  Other people complain about his snoring:  no  Patient has been told he stops breathing in his sleep: no  He has issues with the following:  . no morning headaches. no nocturnal heartburn or reflux. no nasal congestion at night.    Movement:  Patient gets pain, discomfort, with an urge to move:  sometimes has  restless legs symptoms, especially after working out. It is not that often, not enough for him to be concerned about it.   It happens when he is resting:   yes  It happens more at night:   only at night  Patient has been told he kicks his legs at night:    no     Behaviors in Sleep:  Oscar Newby has experienced the following behaviors while sleeping:   sleep talking as a kid. As a kid, he had sleep paralysis for years with a hallucination that his closet would move. He would wake drenched in sweat.   He has experienced sudden muscle weakness during the day:    Pt denies bruxism, sleep talking, sleep walking, and dream enactment  behavior. Pt denies sleep paralysis, hypnagogue and cataplexy.       Is there anything else you would like your sleep provider to know:        CAFFEINE AND OTHER SUBSTANCES:    Patient consumes caffeinated beverages per day:    1-2 cups of coffee   Last caffeine use is usually:   2 PM, usually before 10:30 AM.  List of any prescribed or over the counter stimulants that patient takes:   none  List of any prescribed or over the counter sleep medication patient takes:   none  List of previous sleep medications that patient has tried:   magnesium, L-theanine (marginal benefit). Cannabis helps him sleep but does not feel better the next day. Tylenol PM and melatonin. Melatonin made the thoughts worse.   Patient drinks alcohol to help them sleep:  no  Patient drinks alcohol near bedtime:   infrequent    Family History:  Patient has a family member been diagnosed with a sleep disorder:      Father has had sleep studies. He has difficulty staying asleep. Unsure if he has a diagnosis.     Social History:  He works as a mental health therapist, couples therapist. Day shift.  He lives with his wife.    SCALES:    EPWORTH SLEEPINESS SCALE         8/16/2024     3:19 PM    [place] Sleepiness Scale ( SEBASTIÁN Garcia  [id number]<br>ESS - [place]/English - Final version - 21 Nov 07 - [facility] [employer].)   Sitting and reading Would never doze   Watching TV Slight chance of dozing   Sitting, inactive in a public place (e.g. a theatre or a meeting) Would never doze   As a passenger in a car for an hour without a break Would never doze   Lying down to rest in the afternoon when circumstances permit Would never doze   Sitting and talking to someone Would never doze   Sitting quietly after a lunch without alcohol Would never doze   In a car, while stopped for a few minutes in traffic Would never doze   [place] Score (MC) 1   [place] Score (Sleep) 1         INSOMNIA SEVERITY INDEX (FREEDOM)          8/16/2024     8:46 AM   Insomnia Severity Index  "(FREEDOM)   Difficulty falling asleep 0   Difficulty staying asleep 2   Problems waking up too early 2   How SATISFIED/DISSATISFIED are you with your CURRENT sleep pattern? 3   How NOTICEABLE to others do you think your sleep problem is in terms of impairing the quality of your life? 2   How WORRIED/DISTRESSED are you about your current sleep problem? 2   To what extent do you consider your sleep problem to INTERFERE with your daily functioning (e.g. daytime fatigue, mood, ability to function at work/daily chores, concentration, memory, mood, etc.) CURRENTLY? 2   FREEDOM Total Score 13       Guidelines for Scoring/Interpretation:  Total score categories:  0-7 = No clinically significant insomnia   8-14 = Subthreshold insomnia   15-21 = Clinical insomnia (moderate severity)  22-28 = Clinical insomnia (severe)  Used via courtesy of www.[employer].va.gov with permission from Enrike Gordon PhD., [facility]      STOP BANG         8/16/2024     3:23 PM   STOP BANG Questionnaire (  2008, the American Society of Anesthesiologists, Inc. [employer]   BMI Clinic: 20.62         GAD7        7/3/2023    10:58 AM   ALAN-7    1. Feeling nervous, anxious, or on edge 0   2. Not being able to stop or control worrying 0   3. Worrying too much about different things 1   4. Trouble relaxing 1   5. Being so restless that it is hard to sit still 1   6. Becoming easily annoyed or irritable 0   7. Feeling afraid, as if something awful might happen 0   ALAN-7 Total Score 3   If you checked any problems, how difficult have they made it for you to do your work, take care of things at home, or get along with other people? Not difficult at all         CAGE-AID         No data to display                CAGE-AID reprinted with permission from the [place] Medical Journal, CAILIN Cain. and TRISTAN Duron, \"Conjoint screening questionnaires for alcohol and drug abuse\" [place] Medical Journal 94: 135-140, 1995.      PATIENT HEALTH " QUESTIONNAIRE-9 (PHQ - 9)        7/3/2023    10:58 AM   PHQ-9 ([employer])   1.  Little interest or pleasure in doing things 1   2.  Feeling down, depressed, or hopeless 0   3.  Trouble falling or staying asleep, or sleeping too much 0   4.  Feeling tired or having little energy 0   5.  Poor appetite or overeating 0   6.  Feeling bad about yourself - or that you are a failure or have let yourself or your family down 0   7.  Trouble concentrating on things, such as reading the newspaper or watching television 1   8.  Moving or speaking so slowly that other people could have noticed. Or the opposite - being so fidgety or restless that you have been moving around a lot more than usual 0   9.  Thoughts that you would be better off dead, or of hurting yourself in some way 0   PHQ-9 Total Score 2   If you checked off any problems, how difficult have these problems made it for you to do your work, take care of things at home, or get along with other people? Not difficult at all   6.  Feeling bad about yourself 0   7.  Trouble concentrating 1   8.  Moving slowly or restless 0   9.  Suicidal or self-harm thoughts 0   Difficulty at work, home, or with people Not difficult at all       Developed by Johana Martinez, Janiya Salamanca, Sukumar Diallo and colleagues, with an educational belén from [employer] No permission required to reproduce, translate, display or distribute.        Allergies:    No Known Allergies    Medications:    Current Outpatient Medications   Medication Sig Dispense Refill    L-Theanine 200 MG CAPS  (Patient not taking: Reported on 4/23/2024)      MAGNESIUM GLYCINATE ORAL Take 400 mg by mouth daily (Patient not taking: Reported on 4/23/2024)      Vitamin D, Cholecalciferol, 25 MCG (1000 UT) CAPS Pt is taking 44668 units per day (Patient not taking: Reported on 7/3/2023)         Problem List:  Patient Active Problem List    Diagnosis Date Noted    Sleep disorder 04/23/2024     Priority: Medium     History of vitamin D deficiency 01/09/2024     Priority: Medium    Protein-calorie malnutrition (H) 10/06/2023     Priority: Medium    Osteoporosis without current pathological fracture, unspecified osteoporosis type 07/03/2023     Priority: Medium     osteoporosis with spinal compression fractures, several joint subluxations, hypermobility, and history of vitamin D deficiency who presents for further evaluation of osteoporosis. He saw an outside endocrinologist for this several years ago. Thorough lab workup at that time showed low vitamin D and was unremarkable for other endocrine causes. While most recent DEXA can't be directly compared to prior scans, it seems BMD has improved with Z-scores in the normal range after 6-7 years of consistent vitamin D supplementation. However, will repeat labs today to revaluate for potential causes given his young age. In addition, his hypermobility and joint subluxations, along with his notable family history, may be indicative of an underlying CTD, which could contribute to decreased bone density. Recommend rheumatology referral for further evaluation of this.     Plan:  - Labs today  - Restart vitamin D supplement, pending lab results      Orders Placed This Encounter   Procedures    Comprehensive metabolic panel    Vitamin D Deficiency (D3 Only)    1,25 Dihydroxyvitamin D    Phosphorus    Magnesium    Parathyroid Hormone Intact   - Rheumatology referral for joint pain and history of subluxations        Elle Olmos, MS3  [employer]                                                                                                                                                                                   Chief complaint: Oscar is a 28 year old male seen in consultation for osteoporosis.      HPI  History of osteoporosis, diagnosed by low bone density on DEXA + compression fractures.     First noticed back pain while bungee jumping in 2015.  Then had another episode of back pain following skiing accident in December 2015. Spinal MRI in 2016 showed chronic thoracic and lumbar compression fractures. He was then referred to Dr. Giles in endocrinology at [facility], who he saw for the first time in September 2016. DEXA on 10/7/2016 (at [facility]) showed L1-L4 Z-score of -2.2. Thorough lab work-up at this time showed low vitamin D but was otherwise normal for other endocrine causes.     He started prescription-strength vitamin D supplementation, believes he was initially taking 50,000 units daily. Transitioned to an OTC dose at some point, which he was taking daily until about 6 months ago.     Most recent DEXA from 7/17/2023 demonstrated normal bone density; Z-score lumbar spine -1.6, left total femur -1.5, right total femur -1.0.     Besides the thoracic/lumbar compression fractures, he has not had any new fractures. Notably, he has a personal and family history of joint hypermobility and skin laxity, as wel as history of right shoulder subluxation a couple years ago while rock climbing and left rib subluxation a couple months ago after stepping down from a height of 1.5 feet. He saw PT in the past for the shoulder injury and continues seeing them currently for the rib injury. Both parents and several other family members with joint hypermobility, some with skin laxity as well. He has not been formally evaluated for CTD. To his knowledge, neither has anyone in his family. No family history of spinal compression fractures or other fractures.     Fractures: Thoracic/lumbar compression fractures, toe fracture in middle school  Teeth: No teeth defects  Dietary calcium: 1 serving per day  Tobacco use: No  Alcohol: 2 beers per week  Kidney stones: No  Diet: 2-2.5 meals per day, typically consists of meat, fish, vegetables, carbs. Feels like diet has been fairly healthy throughout his life. Besides the vitamin D deficiency, denies any known  malnutrition or period of under-eating.  Exercise: Activity has been limited by recent injuries but was previously quite active, enjoys swimming and rock climbing  Malabsorption: Negative anti-TTG and anti-gliadin in 2016. No history of IBD. Denies diarrhea or constipation.   Family history: Several family members with hypermobility but no one with a formal diagnosis, no kidney stones, no fractures, no tooth defects     Relevant labs and/or imaging are as follows:    Latest Reference Range & Units 01/11/22 15:14 07/03/23 10:26   Vitamin D Deficiency screening 20 - 75 ug/L 88 (H) 34        Latest Reference Range & Units 01/11/18 16:42 10/16/19 13:58   Vitamin D, Total (25-Hydroxy) 30.0 - 80.0 ng/mL 26.0 (L)  26.0 (L) 18.6 (L)        Latest Reference Range & Units 07/03/23 10:26   Calcium 8.6 - 10.0 mg/dL 9.6      9/27/2016  25-hydroxyvitamin D 25  Ionized calcium 5.0  PTHi 19  Cortisol 12  Prolactin 6.9  IGF-1 189  GH 0.8  FSH 2.5  LH 1  Testosterone 480  TSH 1.06  Calcium, Urine Random 21.5   Creatinine,Urine Random 310   CA/creat ratio 0.07      3/22/2017  Phosphorus 4.1     MR thoracic/lumbar spine 5/31/2016  Impression:   1.  Chronic T4, T5, and L1 compression fractures.  2.  Minimal vertebral body height loss at T6 and T7.  3.  No evidence for acute thoracic spine fracture or ligament injury.     DEXA 7/17/2023  Impression  BMD is within the expected range for age based on WHO criteria Ref. 1     Results   Lumbar spine   Z-score -1.6 (L1-4), BMD is 1.015 g/cm2     Left femoral neck  Z-score -1.2     Right femoral neck  Z-score -0.8     Left total femur  Z-score -1.5 , BMD is 0.873 g/cm2     Right total femur  Z-score -1.0, BMD is 0.944 g/cm2         Chronic midline low back pain without sciatica 07/03/2023     Priority: Medium    Seasonal affective disorder (H24) 03/25/2021     Priority: Medium    Osteopenia 03/22/2017     Priority: Medium    History of fracture due to fall 07/12/2016     Priority: Medium     Esophageal reflux      Priority: Medium     Created by Conversion      Formatting of this note might be different from the original.  Created by Conversion      Hypermobility syndrome      Priority: Medium     Created by Conversion      Formatting of this note might be different from the original.  Created by Conversion          Past Medical/Surgical History:  Past Medical History:   Diagnosis Date    Acute pharyngitis     Created by Conversion        Formatting of this note might be different from the original.  Created by Conversion    Callus     Created by Conversion        Formatting of this note might be different from the original.  Created by Conversion    Hypermobility of joint     Osteoporosis     Seasonal affective disorder (H24)     Vitamin D deficiency      History reviewed. No pertinent surgical history.    Social History:  Social History     Socioeconomic History    Marital status:      Spouse name: Not on file    Number of children: Not on file    Years of education: Not on file    Highest education level: Not on file   Occupational History    Not on file   Tobacco Use    Smoking status: Never     Passive exposure: Never    Smokeless tobacco: Never   Vaping Use    Vaping status: Never Used   Substance and Sexual Activity    Alcohol use: Yes     Alcohol/week: 2.0 standard drinks of alcohol     Types: 2 Cans of beer per week     Comment: 2 drinks per week    Drug use: Not Currently    Sexual activity: Yes     Partners: Female     Birth control/protection: None   Other Topics Concern    Not on file   Social History Narrative    Not on file     Social Determinants of Health     Financial Resource Strain: Unknown (9/22/2023)    Financial Resource Strain     Within the past 12 months, have you or your family members you live with been unable to get utilities (heat, electricity) when it was really needed?: Patient refused   Food Insecurity: Unknown (9/22/2023)    Food Insecurity     Within the past 12  "months, did you worry that your food would run out before you got money to buy more?: Patient refused     Within the past 12 months, did the food you bought just not last and you didn t have money to get more?: Patient refused   Transportation Needs: Unknown (9/22/2023)    Transportation Needs     Within the past 12 months, has lack of transportation kept you from medical appointments, getting your medicines, non-medical meetings or appointments, work, or from getting things that you need?: Patient refused   Physical Activity: Not on file   Stress: Not on file   Social Connections: Not on file   Interpersonal Safety: High Risk (10/6/2023)    Interpersonal Safety     Do you feel physically and emotionally safe where you currently live?: No     Within the past 12 months, have you been hit, slapped, kicked or otherwise physically hurt by someone?: No     Within the past 12 months, have you been humiliated or emotionally abused in other ways by your partner or ex-partner?: No   Housing Stability: Unknown (9/22/2023)    Housing Stability     Do you have housing? : Patient refused     Are you worried about losing your housing?: Patient refused       Family History:  Family History   Problem Relation Age of Onset    No Known Problems Mother     No Known Problems Father     No Known Problems Brother     Anxiety Disorder Brother     Hypertension Other     Other - See Comments Other         knee/hip replacement       Review of Systems:  A complete review of systems reviewed by me is negative with the exeption of what has been mentioned in the history of present illness.        Physical Examination:  Vitals: Ht 1.905 m (6' 3\")   Wt 74.8 kg (165 lb)   BMI 20.62 kg/m    BMI= Body mass index is 20.62 kg/m .           GENERAL APPEARANCE: healthy, alert, no distress, and cooperative  EYES: Eyes grossly normal to inspection and wearing glasses  HENT: oropharynx mildly crowded and tongue base enlarged  NECK: no asymmetry, masses, " "or scars  RESP: no apparent respiratory distress (retractions or difficulty speaking in full sentences), no audible wheeze or cough   Mallampati Class: III.  Tonsillar Stage: 1  hidden by pillars.         Data: All pertinent previous laboratory data reviewed     Recent Labs   Lab Test 12/04/23  1602 07/03/23  1026    141   POTASSIUM 3.8 4.1   CHLORIDE 102 105   CO2 26 25   ANIONGAP 11 11   * 88   BUN 13.8 14.0   CR 0.80 0.81   MILE 9.8 9.6       No results for input(s): \"WBC\", \"RBC\", \"HGB\", \"HCT\", \"MCV\", \"MCH\", \"[facility]\", \"RDW\", \"PLT\" in the last 33891 hours.    Recent Labs   Lab Test 12/04/23  1602   PROTTOTAL 8.1   ALBUMIN 4.7   BILITOTAL 0.2   ALKPHOS 61   AST 19   ALT 20       No results found for: \"TSH\"    No results found for: \"UAMP\", \"UBARB\", \"BENZODIAZEUR\", \"UCANN\", \"UCOC\", \"OPIT\", \"UPCP\"    No results found for: \"IRONSAT\", \"FO99315\", \"JAILENE\"    No results found for: \"PH\", \"PHARTERIAL\", \"PO2\", \"EU3YGHLOVBG\", \"SAT\", \"PCO2\", \"HCO3\", \"BASEEXCESS\", \"DILIP\", \"BEB\"    @LABRCNTIPR(phv:4,pco2v:4,po2v:4,hco3v:4,terry:4,o2per:4)@    Echocardiology: No results found for this or any previous visit (from the past 4320 hour(s)).    Chest x-ray: No results found for this or any previous visit from the past 365 days.      Chest CT: No results found for this or any previous visit from the past 365 days.      PFT: Most Recent [facility] Pulmonary Function Testing    No results found for: \"20001\"        Bennett Ezra Goltz, PA-C, CASSIE 8/15/2024          "

## 2024-08-16 ENCOUNTER — VIRTUAL VISIT (OUTPATIENT)
Dept: SLEEP MEDICINE | Facility: CLINIC | Age: 29
End: 2024-08-16
Attending: INTERNAL MEDICINE
Payer: COMMERCIAL

## 2024-08-16 VITALS — BODY MASS INDEX: 20.51 KG/M2 | HEIGHT: 75 IN | WEIGHT: 165 LBS

## 2024-08-16 DIAGNOSIS — G47.00 INSOMNIA, UNSPECIFIED TYPE: Primary | ICD-10-CM

## 2024-08-16 PROCEDURE — 99205 OFFICE O/P NEW HI 60 MIN: CPT | Mod: 95 | Performed by: PHYSICIAN ASSISTANT

## 2024-08-16 PROCEDURE — G2211 COMPLEX E/M VISIT ADD ON: HCPCS | Mod: 95 | Performed by: PHYSICIAN ASSISTANT

## 2024-08-16 ASSESSMENT — SLEEP AND FATIGUE QUESTIONNAIRES
HOW LIKELY ARE YOU TO NOD OFF OR FALL ASLEEP IN A CAR, WHILE STOPPED FOR A FEW MINUTES IN TRAFFIC: WOULD NEVER DOZE
HOW LIKELY ARE YOU TO NOD OFF OR FALL ASLEEP WHILE LYING DOWN TO REST IN THE AFTERNOON WHEN CIRCUMSTANCES PERMIT: WOULD NEVER DOZE
HOW LIKELY ARE YOU TO NOD OFF OR FALL ASLEEP WHILE SITTING AND READING: WOULD NEVER DOZE
HOW LIKELY ARE YOU TO NOD OFF OR FALL ASLEEP WHILE SITTING INACTIVE IN A PUBLIC PLACE: WOULD NEVER DOZE
HOW LIKELY ARE YOU TO NOD OFF OR FALL ASLEEP WHILE SITTING QUIETLY AFTER LUNCH WITHOUT ALCOHOL: WOULD NEVER DOZE
HOW LIKELY ARE YOU TO NOD OFF OR FALL ASLEEP WHILE WATCHING TV: SLIGHT CHANCE OF DOZING
HOW LIKELY ARE YOU TO NOD OFF OR FALL ASLEEP WHEN YOU ARE A PASSENGER IN A CAR FOR AN HOUR WITHOUT A BREAK: WOULD NEVER DOZE
HOW LIKELY ARE YOU TO NOD OFF OR FALL ASLEEP WHILE SITTING AND TALKING TO SOMEONE: WOULD NEVER DOZE

## 2024-08-16 ASSESSMENT — PAIN SCALES - GENERAL: PAINLEVEL: MODERATE PAIN (4)

## 2024-08-16 NOTE — NURSING NOTE
Current patient location: [address] [place] [id number]    Is the patient currently in the state of [place]? YES    Visit mode:VIDEO    If the visit is dropped, the patient can be reconnected by: VIDEO VISIT: Text to cell phone:   Telephone Information:   Mobile [phone]       Will anyone else be joining the visit? NO  (If patient encounters technical issues they should call [phone]    How would you like to obtain your AVS? MyChart    Are changes needed to the allergy or medication list? No    Are refills needed on medications prescribed by this physician? NO    Rooming Documentation:  Questionnaire(s) completed      Reason for visit: Consult    Jonah PAKF

## 2024-08-16 NOTE — PATIENT INSTRUCTIONS
"[facility] Brief CBT-I  Lesson 2: Sleep Scheduling and Stimulus Control Techniques    Core Sleep Training    Now that you understand a bit more about how sleep works and what causes insomnia, you are ready to begin the most important and intensive part of brief CBT-I we call  Core Sleep Training, sometimes called \"sleep boot camp.\".  This process involves practicing five key habits that will:    Strengthen your sleep drive  Strengthen your biological sleep rhythm  Strengthen the link between your bed and sleep    You will need to have information from your sleep diary in order begin this critical step of your program.     Studies show that learning and practicing these basic sleep habits are key to achieving stronger, healthier sleep. The focus of treatment is improving the quality of sleep (not quantity) and how you feel during the day.     How long will it take to work?    Research shows that making significant changes in sleep habits improves sleep quality and how you feel. Improvement takes time and is not always steady. You  may see improvement within 2-3 weeks but will need to maintain these new habits over time for the best results.Don't lose heart if you experience minor setbacks along the way. While sleep medications may work faster, they often come with considerable side effects and are less effective over time requiring  increasing the dose or switching to a different medication.   The evidence is clear that CBT-I is the safest and most effective long-term solution for treatment of insomnia.     Are there any side effects?    CBT-I has been shown to be a safe and effective treatment with few side effects.  The most common early side effect you may experience is a short term increase in daytime sleepiness.  It will be important for you to manage you sleepiness by avoiding driving or operating equipment if sleepy or drowsy.     Core Sleep Training: How to do it? Five Steps    Reduce Your Time in " Bed    Spending extra time in bed trying to sleep does not work and harms your sleep.  It increases sleep disruption and weakens your sleep drive.     Less time in bed means more time up and awake leading to quicker, deeper sleep.    This technique does not reduce the amount of sleep you get, just the time you are awake in bed.                                             2. Don't go to bed until you feel sleepy (not tired or fatigued)     This helps increase your sleep drive by keeping you awake longer.  If you go to bed when you're not sleepy, it gives your brain the wrong message and can lead to frustration.     If you feel sleepy before your prescribed bedtime, find activities that can help you stay awake until it is time for you to go to bed. Even brief naps in the evening can be very disruptive of sleep at night.     3, Don't stay in bed unless you are sleeping      If you are unable to fall asleep or return to sleep after about 30 minutes, get out of bed. This helps train your brain that the bed is for sleep. It is harmful to your sleep when you are worried or frustrated in bed. Do not read, eat, worry, think about sleep, use mobile phones or tablets, or watch TV in bed. Do not watch the clock during the night.     Go to another room and do something relaxing. Plan things you can do when you get out of bed. Avoid use of mobile devices or computers when out of bed.    Return to bed only when you feel sleepy again.  Repeat as often as needed throughout the night.     4. Get out of bed at the same time every day    Getting up at the same time helps set your biological clock. It is important to stick to your wake time no matter how much sleep you got the night before or how you feel in the morning.    Varying your wake can have the same effect as jet lag.  It disrupts your biological clock and makes you feel more tired and exhausted.  Trying to  catch up  on sleep on the weekends only makes things worse and sets  you up for a cycle of poor sleep the following weekdays.      Make sure you set an alarm and keep it away from the bed to prevent looking at the clock during the night.      5. Avoid daytime napping    Avoid daytime napping if possible. Napping partially fulfills your need for sleep and weakens your sleep drive at night. And the later you nap in the day the more effect it has on your sleep at night.    However, if you find yourself sleepy (not just tired) you can take a brief 15-30 minute nap during the day if needed.  Naps within 7-8 hours of your prescribed wake time are less likely to affect your sleep the coming night.  Sleepy people make more mistakes and may hurt themselves or others. Therefore, safety trumps all other sleep prescriptions.  Never drive or operate equipment if drowsy or sleepy.                     Total Time in Bed:  8 hours       Use your Oura ring to find an average estimate of when you finish REM sleep and set your alarm to that time. Set your bedtime to 8 hours before that. Keep that schedule every day.     Changing Your Sleep Schedule Prescription    After 2 weeks, you can adjust your sleep schedule prescription based on how well you are sleeping. Use the following guidelines to change your prescribed time in bed based on information from your sleep diary:         Increase Time in Bed by 15 Minutes IF:    you are falling asleep in less than 30 minutes AND awake less than 30 minutes during the night AND you feel sleepy during the day.         Decrease Time in Bed by 15 Minutes IF:    it is taking longer than 30 minutes to fall asleep OR you are awake more than 30 minutes during the night.      DO NOT decrease your sleep schedule below 6  hours     Behavioral Training and Sleeping Pills:    It is important to avoid driving or using equipment within 8 hours of taking sleeping pills or longer if you notice side effects such as grogginess when you get up for the day.  Sleeping pills can cause  side effects that affect balance and cognitive functioning, even if you don't feel sleepy.  This in turn increase your risk for accidents and falls in the morning. Talk with your prescribing health care provider if you are experiencing morning side effects that affect daytime functioning.  In Lesson 3 you will learn about different strategies to taper or discontinue sleeping pills in consultation with your health care provider.    Use the [email or web address] hector to record your snoring and breathing. If you identify any abnormal patterns in your snoring, gasps, snorts or pauses, let me know and I will order a sleep study.

## 2024-08-16 NOTE — PROGRESS NOTES
Virtual Visit Details    Type of service:  Video Visit   Start Time: 3:36 PM   End Time: 4:27 PM    Originating Location (pt. Location): Home    Distant Location (provider location):  Off-site  Platform used for Video Visit: Jerry

## 2025-03-13 ENCOUNTER — OFFICE VISIT (OUTPATIENT)
Dept: FAMILY MEDICINE | Facility: CLINIC | Age: 30
End: 2025-03-13
Payer: COMMERCIAL

## 2025-03-13 VITALS
BODY MASS INDEX: 20.76 KG/M2 | HEIGHT: 75 IN | TEMPERATURE: 98.1 F | SYSTOLIC BLOOD PRESSURE: 116 MMHG | OXYGEN SATURATION: 98 % | WEIGHT: 167 LBS | DIASTOLIC BLOOD PRESSURE: 74 MMHG | HEART RATE: 69 BPM | RESPIRATION RATE: 18 BRPM

## 2025-03-13 DIAGNOSIS — Z13.220 LIPID SCREENING: ICD-10-CM

## 2025-03-13 DIAGNOSIS — Z13.0 SCREENING, IRON DEFICIENCY ANEMIA: ICD-10-CM

## 2025-03-13 DIAGNOSIS — Z00.00 ROUTINE GENERAL MEDICAL EXAMINATION AT A HEALTH CARE FACILITY: Primary | ICD-10-CM

## 2025-03-13 DIAGNOSIS — Z13.1 SCREENING FOR DIABETES MELLITUS: ICD-10-CM

## 2025-03-13 DIAGNOSIS — K64.4 EXTERNAL HEMORRHOIDS: ICD-10-CM

## 2025-03-13 DIAGNOSIS — M81.6 LOCALIZED OSTEOPOROSIS WITHOUT CURRENT PATHOLOGICAL FRACTURE: ICD-10-CM

## 2025-03-13 DIAGNOSIS — Z30.2 ENCOUNTER FOR VASECTOMY: ICD-10-CM

## 2025-03-13 LAB
ERYTHROCYTE [DISTWIDTH] IN BLOOD BY AUTOMATED COUNT: 12.3 % (ref 10–15)
EST. AVERAGE GLUCOSE BLD GHB EST-MCNC: 94 MG/DL
HBA1C MFR BLD: 4.9 % (ref 0–5.6)
HCT VFR BLD AUTO: 43.5 % (ref 40–53)
HGB BLD-MCNC: 15 G/DL (ref 13.3–17.7)
MCH RBC QN AUTO: 30.2 PG (ref 26.5–33)
MCHC RBC AUTO-ENTMCNC: 34.5 G/DL (ref 31.5–36.5)
MCV RBC AUTO: 88 FL (ref 78–100)
PLATELET # BLD AUTO: 208 10E3/UL (ref 150–450)
RBC # BLD AUTO: 4.97 10E6/UL (ref 4.4–5.9)
WBC # BLD AUTO: 5.5 10E3/UL (ref 4–11)

## 2025-03-13 SDOH — HEALTH STABILITY: PHYSICAL HEALTH: ON AVERAGE, HOW MANY DAYS PER WEEK DO YOU ENGAGE IN MODERATE TO STRENUOUS EXERCISE (LIKE A BRISK WALK)?: 3 DAYS

## 2025-03-13 ASSESSMENT — SOCIAL DETERMINANTS OF HEALTH (SDOH): HOW OFTEN DO YOU GET TOGETHER WITH FRIENDS OR RELATIVES?: THREE TIMES A WEEK

## 2025-03-13 ASSESSMENT — PAIN SCALES - GENERAL: PAINLEVEL_OUTOF10: NO PAIN (0)

## 2025-03-13 NOTE — PATIENT INSTRUCTIONS
Patient Education     You do not require treatment, other than the usual things we should all do for bone health.      1.  CALCIUM Recommendation 1200mg/day in divided doses of no more than 400-500 mg/dose. This includes both what is in your food AND what is in any supplements you are taking.  Read the labels carefully.    Some Dietary sources of calcium: These also contain vitamin D  Milk                            8 oz            300 mg  Yogurt                          1 cup           400 mg  Hard cheese                     1.5 oz          300 mg  Cottage cheese                  2 cup           300 mg  Orange juice with Calcium       8 oz            300 mg  Low fat dairy sources are recommended    2.  Adequate vitamin D    Recommended vitamin D intake  Over age 50: 800-1000 IU/day  Safe upper limit for vitamin D 4000 IU/day      Good dietary sources of vitamin D:  Fatty fish  liver  Egg yolks  Vitamin D fortified milk, juices, cereals    3.  Bone health exercise  A) 30 minutes of moderate exercise on most days of the week   Weight bearing exercise (ie, walking, jogging, hiking, dancing)    B) Strength training 2 or more times/week in addition to other weight -being exercise.    Strength training  uses weight or resistance beyond that seen in everyday activities -(pilates, weight training with free weights, weight machines or resistance bands)    Weight supporting activities such as water aerobics, floor exercises and stationary cycling may be more appropriate for some patients with compromised bone health   Preventive Care Advice   This is general advice given by our system to help you stay healthy. However, your care team may have specific advice just for you. Please talk to your care team about your preventive care needs.  Nutrition  Eat 5 or more servings of fruits and vegetables each day.  Try wheat bread, brown rice and whole grain pasta (instead of white bread, rice, and pasta).  Get enough calcium and  vitamin D. Check the label on foods and aim for 100% of the RDA (recommended daily allowance).  Lifestyle  Exercise at least 150 minutes each week  (30 minutes a day, 5 days a week).  Do muscle strengthening activities 2 days a week. These help control your weight and prevent disease.  No smoking.  Wear sunscreen to prevent skin cancer.  Have a dental exam and cleaning every 6 months.  Yearly exams  See your health care team every year to talk about:  Any changes in your health.  Any medicines your care team has prescribed.  Preventive care, family planning, and ways to prevent chronic diseases.  Shots (vaccines)   HPV shots (up to age 26), if you've never had them before.  Hepatitis B shots (up to age 59), if you've never had them before.  COVID-19 shot: Get this shot when it's due.  Flu shot: Get a flu shot every year.  Tetanus shot: Get a tetanus shot every 10 years.  Pneumococcal, hepatitis A, and RSV shots: Ask your care team if you need these based on your risk.  Shingles shot (for age 50 and up)  General health tests  Diabetes screening:  Starting at age 35, Get screened for diabetes at least every 3 years.  If you are younger than age 35, ask your care team if you should be screened for diabetes.  Cholesterol test: At age 39, start having a cholesterol test every 5 years, or more often if advised.  Bone density scan (DEXA): At age 50, ask your care team if you should have this scan for osteoporosis (brittle bones).  Hepatitis C: Get tested at least once in your life.  STIs (sexually transmitted infections)  Before age 24: Ask your care team if you should be screened for STIs.  After age 24: Get screened for STIs if you're at risk. You are at risk for STIs (including HIV) if:  You are sexually active with more than one person.  You don't use condoms every time.  You or a partner was diagnosed with a sexually transmitted infection.  If you are at risk for HIV, ask about PrEP medicine to prevent HIV.  Get  tested for HIV at least once in your life, whether you are at risk for HIV or not.  Cancer screening tests  Cervical cancer screening: If you have a cervix, begin getting regular cervical cancer screening tests starting at age 21.  Breast cancer scan (mammogram): If you've ever had breasts, begin having regular mammograms starting at age 40. This is a scan to check for breast cancer.  Colon cancer screening: It is important to start screening for colon cancer at age 45.  Have a colonoscopy test every 10 years (or more often if you're at risk) Or, ask your provider about stool tests like a FIT test every year or Cologuard test every 3 years.  To learn more about your testing options, visit:   .  For help making a decision, visit:   https://bit.ly/cv55044.  Prostate cancer screening test: If you have a prostate, ask your care team if a prostate cancer screening test (PSA) at age 55 is right for you.  Lung cancer screening: If you are a current or former smoker ages 50 to 80, ask your care team if ongoing lung cancer screenings are right for you.  For informational purposes only. Not to replace the advice of your health care provider. Copyright   2023 [employer] All rights reserved. Clinically reviewed by the [facility]. [employer] [id number] - REV 01/24.

## 2025-03-13 NOTE — PROGRESS NOTES
Preventive Care Visit  [facility]  Maynor Alvarez PA-C, Physician Assistant - Medical  Mar 13, 2025      Assessment & Plan     Routine general medical examination at a health care facility  Immunizations: Vaccines updated.   Mental Health: No concerns  STI Screenings: Declined.   BP: WNL at 116/74.   Hearing and Vision: No concerns   Dental Health: No concerns; see dentist regularly.   Preventative Labs: Below labs ordered.   Skin Cancer: No personal/family history of skin cancer. No concerning skin lesions. Recommended continuing to observe for any new/existing concerning moles that are changing according to ABCDE criteria and adequate utilization of sunscreen SPF 50 when outside with reapplication   Colon Cancer: Not due.   Prostate Cancer: Not due.   Testicular Cancer: Reviewed that testicular cancer most often effects younger men and recommended regular self exams of both testicles and coming in if any bumps or lumps are felt especially if painless. No concerns per patient.   Lung Cancer: Never smoker, N/A.  AAA: Never smoker, N/A.    Discussed getting at least 150 minutes of aerobic exercise weekly, healthy diet with focus on 4-5 serving of fruits/veggies, lean meat, and reducing saturated fats/sugars, and getting 7-8 hours of sleep nightly (should feel rested when waking up or not getting enough).    - HPV9 (GARDASIL 9)  - INFLUENZA VACCINE,SPLIT VIRUS,TRIVALENT,PF(FLUZONE)  - REVIEW OF HEALTH MAINTENANCE PROTOCOL ORDERS    Screening for diabetes mellitus  - Hemoglobin A1c    Lipid screening  - Lipid panel reflex to direct LDL Non-fasting    Screening, iron deficiency anemia  - CBC with platelets    Encounter for vasectomy  Referred to discuss vasectomy with Urology.     - Adult Urology  Referral; Future    External hemorrhoids  Deferred exam today; history/symptoms support hemorrhoids with no red flag symptoms. Discussed supportive treatments for acute flares and  prevention, including hydrocortisone cream or suppositories rectally and sitz baths for acute flares and fiber, fluids, and other measures for prevention. Discussed AVS handout in detail with patient.     Localized osteoporosis without current pathological fracture  Reviewed importance of daily Vitamin D and calcium supplementation and weight bearing exercises. Not currently on bone building medicine, nor has he ever been. We discussed getting endocrinology e-consult for their input/recommendation on repeat DEXA scan or visit with them next year at his annual physical with the recent normal DEXA scan in 2023. Check Vitamin D, calcium, and liver/kidney function today.     - Vitamin D Deficiency; Future  - Comprehensive metabolic panel; Future    Hypermobile joints  Referred to Dr. Parisi full formal evaluation.     Patient has been advised of split billing requirements and indicates understanding: Yes        Counseling  Appropriate preventive services were addressed with this patient via screening, questionnaire, or discussion as appropriate for fall prevention, nutrition, physical activity, Tobacco-use cessation, social engagement, weight loss and cognition.  Checklist reviewing preventive services available has been given to the patient.  Reviewed patient's diet, addressing concerns and/or questions.   He is at risk for lack of exercise and has been provided with information to increase physical activity for the benefit of his well-being.       Favian Moore is a 29 year old, presenting for the following:  Physical and Establish Care        3/13/2025    10:48 AM   Additional Questions   Roomed by Ayse WILSON  Here for preventative visit.     Interested in vasectomy.     Hemorrhoid - blood on paper towel with bowel movements with starting weight lifting. No other stool changes, abdominal, nausea/vomiting, weight loss, night sweats, or fever/chills.     History of osteoporosis - First noticed back  pain while bungee jumping in 2015. Then had another episode of back pain following skiing accident in December 2015. Spinal MRI in 2016 showed chronic thoracic and lumbar compression fractures. He was then referred to Dr. Giles in endocrinology at [facility], who he saw for the first time in September 2016. DEXA on 10/7/2016 (at [facility]) showed L1-L4 Z-score of -2.2. Thorough lab work-up at this time showed low vitamin D but was otherwise normal for other endocrine causes.     He started prescription-strength vitamin D supplementation, believes he was initially taking 50,000 units daily. Transitioned to an OTC dose at some point, which he was taking daily until about 6 months ago.     Most recent DEXA from 7/17/2023 demonstrated normal bone density; Z-score lumbar spine -1.6, left total femur -1.5, right total femur -1.0.     Besides the thoracic/lumbar compression fractures, he has not had any new fractures. Notably, he has a personal and family history of joint hypermobility and skin laxity, as wel as history of right shoulder subluxation a couple years ago while rock climbing and left rib subluxation a couple months ago after stepping down from a height of 1.5 feet. He saw PT in the past for the shoulder injury and continues seeing them currently for the rib injury. Both parents and several other family members with joint hypermobility, some with skin laxity as well. He has not been formally evaluated for CTD. To his knowledge, neither has anyone in his family. No family history of spinal compression fractures or other fractures.    Hypermobility of joints, never had formal evaluation. Seems to run in family. Normal echo in 2016.             Advance Care Planning  Patient does not have a Health Care Directive: Discussed advance care planning with patient; information given to patient to review.      3/13/2025   General Health   How would you rate your overall physical health? Good   Feel stress  (tense, anxious, or unable to sleep) Only a little   (!) STRESS CONCERN      3/13/2025   Nutrition   Three or more servings of calcium each day? Yes   Diet: Regular (no restrictions)   How many servings of fruit and vegetables per day? (!) 0-1   How many sweetened beverages each day? 0-1         3/13/2025   Exercise   Days per week of moderate/strenous exercise 3 days         3/13/2025   Social Factors   Frequency of gathering with friends or relatives Three times a week   Worry food won't last until get money to buy more Yes   Food not last or not have enough money for food? No   Do you have housing? (Housing is defined as stable permanent housing and does not include staying ouside in a car, in a tent, in an abandoned building, in an overnight shelter, or couch-surfing.) Yes   Are you worried about losing your housing? No   Lack of transportation? No   Unable to get utilities (heat,electricity)? No   (!) FOOD SECURITY CONCERN PRESENT      3/13/2025   Dental   Dentist two times every year? Yes           Today's PHQ-2 Score:       3/13/2025    10:44 AM   PHQ-2 ( 1999 [employer])   Q1: Little interest or pleasure in doing things 0   Q2: Feeling down, depressed or hopeless 0   PHQ-2 Score 0    Q1: Little interest or pleasure in doing things Not at all   Q2: Feeling down, depressed or hopeless Not at all   PHQ-2 Score 0       Patient-reported           3/13/2025   Substance Use   Alcohol more than 3/day or more than 7/wk No   Do you use any other substances recreationally? (!) CANNABIS PRODUCTS     Social History     Tobacco Use    Smoking status: Never     Passive exposure: Never    Smokeless tobacco: Never   Vaping Use    Vaping status: Never Used   Substance Use Topics    Alcohol use: Yes     Alcohol/week: 2.0 standard drinks of alcohol     Types: 2 Cans of beer per week     Comment: 2 drinks per week    Drug use: Yes     Types: Marijuana     Comment: 0-3 times per week           3/13/2025   STI Screening   New sexual  "partner(s) since last STI/HIV test? No         3/13/2025   Contraception/Family Planning   Questions about contraception or family planning (!) YES, referred for vasectomy         Reviewed and updated as needed this visit by Provider   Tobacco  Allergies  Meds  Problems  Med Hx  Surg Hx  Fam Hx     Sexual Activity                   Objective    Exam  /74 (BP Location: Right arm, Patient Position: Sitting, Cuff Size: Adult Regular)   Pulse 69   Temp 98.1  F (36.7  C) (Temporal)   Resp 18   Ht 1.892 m (6' 2.5\")   Wt 75.8 kg (167 lb)   SpO2 98%   BMI 21.15 kg/m     Estimated body mass index is 21.15 kg/m  as calculated from the following:    Height as of this encounter: 1.892 m (6' 2.5\").    Weight as of this encounter: 75.8 kg (167 lb).    Physical Exam    GENERAL: alert and no distress  EYES: Eyes grossly normal to inspection, PERRL and conjunctivae and sclerae normal  HENT: ear canals and TM's normal, nose and mouth without ulcers or lesions  NECK: no adenopathy, no asymmetry, masses, or scars. No thyromegaly or nodules palpated.  RESP: lungs clear to auscultation - no rales, rhonchi or wheezes  CV: regular rate and rhythm, normal S1 S2, no S3 or S4, no murmur, click or rub, no peripheral edema  ABDOMEN: soft, nontender, no hepatosplenomegaly, no masses and bowel sounds normal  MS: no gross musculoskeletal defects noted, no edema  SKIN: no suspicious lesions or rashes  : Deferred.   NEURO: Normal strength and tone, mentation intact and speech normal  PSYCH: mentation appears normal, affect normal/bright        Signed Electronically by: Maynor Alvarez PA-C    "

## 2025-03-13 NOTE — NURSING NOTE
Prior to immunization administration, verified patients identity using patient s name and date of birth. Please see Immunization Activity for additional information.     Screening Questionnaire for Adult Immunization    Are you sick today?   No   Do you have allergies to medications, food, a vaccine component or latex?   No   Have you ever had a serious reaction after receiving a vaccination?   No   Do you have a long-term health problem with heart, lung, kidney, or metabolic disease (e.g., diabetes), asthma, a blood disorder, no spleen, complement component deficiency, a cochlear implant, or a spinal fluid leak?  Are you on long-term aspirin therapy?   No   Do you have cancer, leukemia, HIV/AIDS, or any other immune system problem?   No   Do you have a parent, brother, or sister with an immune system problem?   No   In the past 3 months, have you taken medications that affect  your immune system, such as prednisone, other steroids, or anticancer drugs; drugs for the treatment of rheumatoid arthritis, Crohn s disease, or psoriasis; or have you had radiation treatments?   No   Have you had a seizure, or a brain or other nervous system problem?   No   During the past year, have you received a transfusion of blood or blood    products, or been given immune (gamma) globulin or antiviral drug?   No   For women: Are you pregnant or is there a chance you could become       pregnant during the next month?   No   Have you received any vaccinations in the past 4 weeks?   No     Immunization questionnaire answers were all negative.      Patient instructed to remain in clinic for 15 minutes afterwards, and to report any adverse reactions.     Screening performed by CARMITA YIN on 3/13/2025 at 12:05 PM.

## 2025-03-15 LAB
CHOLEST SERPL-MCNC: 222 MG/DL
FASTING STATUS PATIENT QL REPORTED: YES
HDLC SERPL-MCNC: 59 MG/DL
LDLC SERPL CALC-MCNC: 146 MG/DL
NONHDLC SERPL-MCNC: 163 MG/DL
TRIGL SERPL-MCNC: 84 MG/DL
VIT D+METAB SERPL-MCNC: 19 NG/ML (ref 20–50)

## 2025-03-17 LAB
ALBUMIN SERPL BCG-MCNC: 4.7 G/DL (ref 3.5–5.2)
ALP SERPL-CCNC: 56 U/L (ref 40–150)
ALT SERPL W P-5'-P-CCNC: 68 U/L (ref 0–70)
ANION GAP SERPL CALCULATED.3IONS-SCNC: 12 MMOL/L (ref 7–15)
AST SERPL W P-5'-P-CCNC: 52 U/L (ref 0–45)
BILIRUB SERPL-MCNC: 0.4 MG/DL
BUN SERPL-MCNC: 12.6 MG/DL (ref 6–20)
CALCIUM SERPL-MCNC: ABNORMAL MG/DL
CHLORIDE SERPL-SCNC: 103 MMOL/L (ref 98–107)
CREAT SERPL-MCNC: 0.8 MG/DL (ref 0.67–1.17)
EGFRCR SERPLBLD CKD-EPI 2021: >90 ML/MIN/1.73M2
FASTING STATUS PATIENT QL REPORTED: YES
GLUCOSE SERPL-MCNC: 87 MG/DL (ref 70–99)
HCO3 SERPL-SCNC: 25 MMOL/L (ref 22–29)
Lab: NORMAL
PERFORMING LABORATORY: NORMAL
POTASSIUM SERPL-SCNC: 4 MMOL/L (ref 3.4–5.3)
PROT SERPL-MCNC: 7.3 G/DL (ref 6.4–8.3)
SODIUM SERPL-SCNC: 140 MMOL/L (ref 135–145)
SPECIMEN STATUS: NORMAL
TEST NAME: NORMAL

## 2025-03-18 LAB — MISCELLANEOUS TEST 1 (ARUP): NORMAL

## 2025-03-24 ENCOUNTER — VIRTUAL VISIT (OUTPATIENT)
Dept: UROLOGY | Facility: CLINIC | Age: 30
End: 2025-03-24
Attending: STUDENT IN AN ORGANIZED HEALTH CARE EDUCATION/TRAINING PROGRAM
Payer: COMMERCIAL

## 2025-03-24 DIAGNOSIS — F41.9 ANXIETY DUE TO INVASIVE PROCEDURE: Primary | ICD-10-CM

## 2025-03-24 DIAGNOSIS — Z30.2 ENCOUNTER FOR VASECTOMY: ICD-10-CM

## 2025-03-24 PROCEDURE — 98001 SYNCH AUDIO-VIDEO NEW LOW 30: CPT | Performed by: STUDENT IN AN ORGANIZED HEALTH CARE EDUCATION/TRAINING PROGRAM

## 2025-03-24 PROCEDURE — 1126F AMNT PAIN NOTED NONE PRSNT: CPT | Mod: 95 | Performed by: STUDENT IN AN ORGANIZED HEALTH CARE EDUCATION/TRAINING PROGRAM

## 2025-03-24 NOTE — PROGRESS NOTES
VASECTOMY CONSULTATION NOTE  DATE OF VISIT: 3/24/2025  PATIENT NAME: Oscar Newby    YOB: 1995      REASON FOR CONSULTATION: Mr. Oscar Newby is a 29 year old year old gentleman who is seen today requesting a vasectomy as a form of permanent birth control.     He has no children.     No prior scrotal/inguinal surgeries, he does not take blood thinners, he does not have diabetes. He doesn't feel his scrotum is tight.    PAST MEDICAL HISTORY:   Past Medical History:   Diagnosis Date    Acute pharyngitis     Created by Conversion        Formatting of this note might be different from the original.  Created by Conversion    Callus     Created by Conversion        Formatting of this note might be different from the original.  Created by Conversion    Hypermobility of joint     Osteoporosis     Seasonal affective disorder     Vitamin D deficiency        PAST SURGICAL HISTORY: No past surgical history on file.    MEDICATIONS: No current outpatient medications on file.    ALLERGIES: No Known Allergies    FAMILY HISTORY:   Family History   Problem Relation Age of Onset    No Known Problems Mother     No Known Problems Father     No Known Problems Brother     Anxiety Disorder Brother     Hypertension Other     Other - See Comments Other         knee/hip replacement       SOCIAL HISTORY:   Social History     Socioeconomic History    Marital status:      Spouse name: Not on file    Number of children: Not on file    Years of education: Not on file    Highest education level: Not on file   Occupational History    Not on file   Tobacco Use    Smoking status: Never     Passive exposure: Never    Smokeless tobacco: Never   Vaping Use    Vaping status: Never Used   Substance and Sexual Activity    Alcohol use: Yes     Alcohol/week: 2.0 standard drinks of alcohol     Types: 2 Cans of beer per week     Comment: 2 drinks per week    Drug use: Yes     Types: Marijuana     Comment: 0-3 times per week    Sexual  activity: Yes     Partners: Female     Birth control/protection: None   Other Topics Concern    Not on file   Social History Narrative    Not on file     Social Drivers of Health     Financial Resource Strain: Low Risk  (3/13/2025)    Financial Resource Strain     Within the past 12 months, have you or your family members you live with been unable to get utilities (heat, electricity) when it was really needed?: No   Food Insecurity: High Risk (3/13/2025)    Food Insecurity     Within the past 12 months, did you worry that your food would run out before you got money to buy more?: No     Within the past 12 months, did the food you bought just not last and you didn t have money to get more?: Yes   Transportation Needs: Low Risk  (3/13/2025)    Transportation Needs     Within the past 12 months, has lack of transportation kept you from medical appointments, getting your medicines, non-medical meetings or appointments, work, or from getting things that you need?: No   Physical Activity: Unknown (3/13/2025)    Exercise Vital Sign     Days of Exercise per Week: 3 days     Minutes of Exercise per Session: Not on file   Stress: No Stress Concern Present (3/13/2025)    [employer] [place] [employer] - Occupational Stress Questionnaire     Feeling of Stress : Only a little   Social Connections: Unknown (3/13/2025)    Social Connection and Isolation Panel [NHANES]     Frequency of Communication with Friends and Family: Not on file     Frequency of Social Gatherings with Friends and Family: Three times a week     Attends Anabaptist Services: Not on file     Active Member of Clubs or Organizations: Not on file     Attends Club or Organization Meetings: Not on file     Marital Status: Not on file   Interpersonal Safety: Low Risk  (3/13/2025)    Interpersonal Safety     Do you feel physically and emotionally safe where you currently live?: Yes     Within the past 12 months, have you been hit, slapped, kicked or otherwise  physically hurt by someone?: No     Within the past 12 months, have you been humiliated or emotionally abused in other ways by your partner or ex-partner?: No   Housing Stability: Low Risk  (3/13/2025)    Housing Stability     Do you have housing? : Yes     Are you worried about losing your housing?: No       Due to the nature of this video visit, no physical exam was performed.     DIAGNOSIS: Request for sterilization    PLAN: The risks of the procedure as well as expectations for recovery and outcomes were explained in detail to him.  He was counseled on the risks for bleeding infection and pain after the procedure. We discussed the risk of post-vasectomy pain syndrome.  He was instructed to continue to use contraception until he had proven azoospermia on a semen specimen.  This would normally be collected at least 3 months after the procedure. Also discussed the rare, but possible risk of re-canalization of the vas, even after successful vasectomy with sterile semen specimen.  He was instructed to hold all anticoagulants medications for one week prior to the procedure.  It was recommended that he have someone else drive him home after his vasectomy.  In light of these risks and expectations he would like to proceed.  We are scheduling a vasectomy in the office in the near future.    Pt. Understands:  -1/1000-1/3000 risk of future pregnancy even with perfectly done vasectomy  -vasectomy is a permanent procedure    -he may cryopreserve sperm if he wishes   -1-5% risk of post-vasectomy pain syndrome   -1-5% risk of complication, primarily infection or bleeding  - he needs to have a semen sample that shows no sperm before getting approval for unprotected intercourse.      Thank you for the kind consultation.    He would like to take 5 mg Valium pre-procedure. He will pick this medication up from his local pharmacy and arrive to clinic 1 hour prior to his vasectomy to sign the consent, then take the medication in our  waiting area. He understands he must have a .     15 minutes spent on the date of the encounter, including direct interaction with the patient, performing chart review, documentation and further activities as noted above.    Video-Visit Details  The patient consents to today's video visit: yes    Type of service:  Video Visit    Video Start Time:  3:55 PM    Video End Time: 4:08 PM    Originating Location (pt. Location): Home    Distant Location (provider location):  [facility] [place]    Platform used for Video Visit: Jerry Rayo MD   Urology  [facility]  Clinic Phone [phone]

## 2025-03-24 NOTE — NURSING NOTE
Current patient location: [address] [place] [id number]    Is the patient currently in the state of [place]? YES    Visit mode: VIDEO    If the visit is dropped, the patient can be reconnected by:VIDEO VISIT: Text to cell phone:   Telephone Information:   Mobile [phone]    and VIDEO VISIT: Send to e-mail at: [email or web address]    Will anyone else be joining the visit? NO  (If patient encounters technical issues they should call [phone]    Are changes needed to the allergy or medication list? Pt stated no med changes    Are refills needed on medications prescribed by this physician? NO- new pt     Rooming Documentation:  Not applicable    Reason for visit: Consult (Vasectomy consult )    Edilma NUNN

## 2025-03-24 NOTE — LETTER
3/24/2025       RE: Oscar Newby  [address] [place] [address]     Dear Colleague,    Thank you for referring your patient, Oscar Newby, to the [facility]. Please see a copy of my visit note below.    VASECTOMY CONSULTATION NOTE  DATE OF VISIT: 3/24/2025  PATIENT NAME: Oscar Newby    YOB: 1995      REASON FOR CONSULTATION: Mr. Oscar Newby is a 29 year old year old gentleman who is seen today requesting a vasectomy as a form of permanent birth control.     He has no children.     No prior scrotal/inguinal surgeries, he does not take blood thinners, he does not have diabetes. He doesn't feel his scrotum is tight.    PAST MEDICAL HISTORY:   Past Medical History:   Diagnosis Date     Acute pharyngitis     Created by Conversion        Formatting of this note might be different from the original.  Created by Conversion     Callus     Created by Conversion        Formatting of this note might be different from the original.  Created by Conversion     Hypermobility of joint      Osteoporosis      Seasonal affective disorder      Vitamin D deficiency        PAST SURGICAL HISTORY: No past surgical history on file.    MEDICATIONS: No current outpatient medications on file.    ALLERGIES: No Known Allergies    FAMILY HISTORY:   Family History   Problem Relation Age of Onset     No Known Problems Mother      No Known Problems Father      No Known Problems Brother      Anxiety Disorder Brother      Hypertension Other      Other - See Comments Other         knee/hip replacement       SOCIAL HISTORY:   Social History     Socioeconomic History     Marital status:      Spouse name: Not on file     Number of children: Not on file     Years of education: Not on file     Highest education level: Not on file   Occupational History     Not on file   Tobacco Use     Smoking status: Never     Passive exposure:  Never     Smokeless tobacco: Never   Vaping Use     Vaping status: Never Used   Substance and Sexual Activity     Alcohol use: Yes     Alcohol/week: 2.0 standard drinks of alcohol     Types: 2 Cans of beer per week     Comment: 2 drinks per week     Drug use: Yes     Types: Marijuana     Comment: 0-3 times per week     Sexual activity: Yes     Partners: Female     Birth control/protection: None   Other Topics Concern     Not on file   Social History Narrative     Not on file     Social Drivers of Health     Financial Resource Strain: Low Risk  (3/13/2025)    Financial Resource Strain      Within the past 12 months, have you or your family members you live with been unable to get utilities (heat, electricity) when it was really needed?: No   Food Insecurity: High Risk (3/13/2025)    Food Insecurity      Within the past 12 months, did you worry that your food would run out before you got money to buy more?: No      Within the past 12 months, did the food you bought just not last and you didn t have money to get more?: Yes   Transportation Needs: Low Risk  (3/13/2025)    Transportation Needs      Within the past 12 months, has lack of transportation kept you from medical appointments, getting your medicines, non-medical meetings or appointments, work, or from getting things that you need?: No   Physical Activity: Unknown (3/13/2025)    Exercise Vital Sign      Days of Exercise per Week: 3 days      Minutes of Exercise per Session: Not on file   Stress: No Stress Concern Present (3/13/2025)    [employer] [place] [employer]ational [employer] - Occupational Stress Questionnaire      Feeling of Stress : Only a little   Social Connections: Unknown (3/13/2025)    Social Connection and Isolation Panel [NHANES]      Frequency of Communication with Friends and Family: Not on file      Frequency of Social Gatherings with Friends and Family: Three times a week      Attends Moravian Services: Not on file      Active Member of Clubs or  Organizations: Not on file      Attends Club or Organization Meetings: Not on file      Marital Status: Not on file   Interpersonal Safety: Low Risk  (3/13/2025)    Interpersonal Safety      Do you feel physically and emotionally safe where you currently live?: Yes      Within the past 12 months, have you been hit, slapped, kicked or otherwise physically hurt by someone?: No      Within the past 12 months, have you been humiliated or emotionally abused in other ways by your partner or ex-partner?: No   Housing Stability: Low Risk  (3/13/2025)    Housing Stability      Do you have housing? : Yes      Are you worried about losing your housing?: No       Due to the nature of this video visit, no physical exam was performed.     DIAGNOSIS: Request for sterilization    PLAN: The risks of the procedure as well as expectations for recovery and outcomes were explained in detail to him.  He was counseled on the risks for bleeding infection and pain after the procedure. We discussed the risk of post-vasectomy pain syndrome.  He was instructed to continue to use contraception until he had proven azoospermia on a semen specimen.  This would normally be collected at least 3 months after the procedure. Also discussed the rare, but possible risk of re-canalization of the vas, even after successful vasectomy with sterile semen specimen.  He was instructed to hold all anticoagulants medications for one week prior to the procedure.  It was recommended that he have someone else drive him home after his vasectomy.  In light of these risks and expectations he would like to proceed.  We are scheduling a vasectomy in the office in the near future.    Pt. Understands:  -1/1000-1/3000 risk of future pregnancy even with perfectly done vasectomy  -vasectomy is a permanent procedure    -he may cryopreserve sperm if he wishes   -1-5% risk of post-vasectomy pain syndrome   -1-5% risk of complication, primarily infection or bleeding  - he needs  to have a semen sample that shows no sperm before getting approval for unprotected intercourse.      Thank you for the kind consultation.    He would like to take 5 mg Valium pre-procedure. He will pick this medication up from his local pharmacy and arrive to clinic 1 hour prior to his vasectomy to sign the consent, then take the medication in our waiting area. He understands he must have a .     15 minutes spent on the date of the encounter, including direct interaction with the patient, performing chart review, documentation and further activities as noted above.    Video-Visit Details  The patient consents to today's video visit: yes    Type of service:  Video Visit    Video Start Time:  3:55 PM    Video End Time: 4:08 PM    Originating Location (pt. Location): Home    Distant Location (provider location):  [facility]    Platform used for Video Visit: [facility]    Kaitlynn Rayo MD   Urology  [facility]  Clinic Phone [phone]      Again, thank you for allowing me to participate in the care of your patient.      Sincerely,    Kaitlynn Rayo MD

## 2025-03-24 NOTE — Clinical Note
David Mcguire-  I saw Oscar today. I'll get him scheduled for an office vasectomy soon.  Please don't hesitate to contact me with further questions. I greatly appreciate the referral.   Kaitlynn Rayo MD Cell: [phone]

## 2025-03-25 PROBLEM — F41.9 ANXIETY DUE TO INVASIVE PROCEDURE: Status: ACTIVE | Noted: 2025-03-25

## 2025-03-25 PROBLEM — Z30.2 ENCOUNTER FOR VASECTOMY: Status: ACTIVE | Noted: 2025-03-25

## 2025-03-25 RX ORDER — DIAZEPAM 5 MG/1
5 TABLET ORAL EVERY 6 HOURS PRN
Qty: 1 TABLET | Refills: 0 | Status: SHIPPED | OUTPATIENT
Start: 2025-03-25

## 2025-03-26 ENCOUNTER — TELEPHONE (OUTPATIENT)
Dept: UROLOGY | Facility: CLINIC | Age: 30
End: 2025-03-26
Payer: COMMERCIAL

## 2025-03-26 NOTE — TELEPHONE ENCOUNTER
----- Message from Gaviota RUBIO sent at 3/25/2025  9:10 AM CDT -----  Regarding: Vasectomy  Please schedule office vasectomy w/ valium    SUSAN  3/24/25

## 2025-05-29 ENCOUNTER — OFFICE VISIT (OUTPATIENT)
Dept: FAMILY MEDICINE | Facility: CLINIC | Age: 30
End: 2025-05-29
Payer: COMMERCIAL

## 2025-05-29 VITALS
WEIGHT: 161 LBS | TEMPERATURE: 97 F | RESPIRATION RATE: 16 BRPM | SYSTOLIC BLOOD PRESSURE: 114 MMHG | HEART RATE: 62 BPM | BODY MASS INDEX: 20.02 KG/M2 | OXYGEN SATURATION: 98 % | HEIGHT: 75 IN | DIASTOLIC BLOOD PRESSURE: 66 MMHG

## 2025-05-29 DIAGNOSIS — M35.7 HYPERMOBILITY SYNDROME: Primary | ICD-10-CM

## 2025-05-29 ASSESSMENT — PAIN SCALES - GENERAL: PAINLEVEL_OUTOF10: MILD PAIN (3)

## 2025-05-29 NOTE — PROGRESS NOTES
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   Since a child  Flexible    Trigger potential:   Any significant surgery, pregnancy or illness that occurred before your symptoms started?   none    Medical history:  Past Medical History:   Diagnosis Date    Acute pharyngitis     Created by Conversion        Formatting of this note might be different from the original.  Created by Conversion    Callus     Created by Conversion        Formatting of this note might be different from the original.  Created by Conversion    Hypermobility of joint     Osteoporosis     Seasonal affective disorder     Vitamin D deficiency 3/22/2017       Family history:  History of FORMAL Connective Tissue Disorder in family   None   Fibromyalgia   None   Denies known family history of autoimmune diseases such as Lupus, Rheumatoid Arthritis, Thyroid pathology or Celiac  Mat grandmother-RA    Other Symptoms:  Dental   denies crowding  endorses TMJ (teenager)  endorses dislocation, subluxation of jaw    Pulm   denies history of asthma    Eyes   denies astigmatism  endorses wearing glasses    Skin   denies easy bruising  endorses abnormal scars  endorses delayed healing  denies varicose veins at young age  denies diagnosis of Raynaud's    Neuro  denies prior diagnosis or evaluation for ADHD  denies prior diagnosis or evaluation for ASD  denies brain fog or concentration issues  denies temperature regulation concerns  denies migraines  denies headaches  denies fibromyalgia  denies chronic fatigue    GI   denies irritable bowel syndrome (in the past, resolved)  denies rectal prolapse  denies abdominal hernias    Cardiology   endorses history of " "syncope (stood up too quickly; stretched too hard) and near syncope  Currently-->no dizziness issues  denies valve concerns, has had echocardiogram-normal  denies evaluation for postural orthostatic tachycardia syndrome (POTS)       denies urinary incontinence    MSK   Hypermobile joints-->ankles; wrists, fingers, shoulders, hips, back  Elbows and knees are the least bendy  Which joints are your most bothersome and/or painful?   R shoulder, sometimes L shoulder  B/l knees  Hips pop/grind a lot  Ankles are ok but has rolled ankle many times (heals well)  Dislocations and/or subluxations?   Jaw  Subluxed R shoulder (happened rock climbing with normal movements)  Ribs (stepping down from a short height)  As a child (b/l shoulders)    Previously seen specialists:  None  PT    Current pain & symptom management:  Exercise/strengthening  PT (last done 1 year ago for the ribs)  Ibuprofen prn      Goals  -prevent subluxation and improve healing/reduce pain      OBJECTIVE    /66 (BP Location: Right arm, Patient Position: Sitting, Cuff Size: Adult Regular)   Pulse 62   Temp 97  F (36.1  C) (Temporal)   Resp 16   Ht 1.895 m (6' 2.6\")   Wt 73 kg (161 lb)   SpO2 98%   BMI 20.34 kg/m        PHYSICAL    Beighton Score :    Beighton Score Left                              Right   1) Passive dorsiflexion and hyperextension of the fifth MCP joint beyond 90   0               0   2) Passive apposition of the thumb to the flexor aspect of the forearm  1               1   3) Passive hyperextension of the elbow beyond 10   0               0   4) Passive hyperextension of the knee beyond 10   0               0   5) Active forward flexion of the trunk with the knees fully extended so that the palms of the hands rest flat on the floor     0   TOTAL SCORE  2     Score does not relate to severity, as stated previously       Beighton Score Total  6  >= for pre-pubertal children and                adolescent  5  >= pubertal adults " to age 50  4  >=  if over 50 years old         Physical Exam  Constitutional:       General: He is not in acute distress.     Appearance: He is not ill-appearing.   Pulmonary:      Effort: Pulmonary effort is normal.   Musculoskeletal:      Cervical back: Normal range of motion and neck supple.      Comments: Hypermobility noted in thumbs, wrists, shoulders, ankles     Neurological:      Mental Status: He is alert.         ASSESSMENT/PLAN    # generalized hypermobility spectrum disorder  Pt does not meet the criteria for hypermobile EDS. Beighton score is 2/9, therefore did not meet criteria 1; [facility]. Pt does meet the criteria for generalized hypermobility spectrum disorder (HSD) due to hypermobility in ankles, fingers (thumbs), wrist, and shoulders. He has had subluxations of ribs and shoulders multiple times w/o trauma or injury.    Discussed long term goals of: preventing joint subluxations/dislocations or other injuries, strengthening joints/muscles, not overstretching. Agreeable to referral to hypermobile physical therapist. Discussed increasing hydration/electrolytes. Should get echo every 3-5 years. Follow up with me as needed.    I spent a total of 40 minutes on the day of the visit.   Time spent by me today doing chart review, history and exam, documentation and further activities per the note      Answers submitted by the patient for this visit:  General Questionnaire (Submitted on 5/29/2025)  Chief Complaint: Chronic problems general questions HPI Form  What is the reason for your visit today? : hypermobility  How many servings of fruits and vegetables do you eat daily?: 2-3  On average, how many sweetened beverages do you drink each day (Examples: soda, juice, sweet tea, etc.  Do NOT count diet or artificially sweetened beverages)?: 1  How many minutes a day do you exercise enough to make your heart beat faster?: 30 to 60  How many days a week do you exercise  enough to make your heart beat faster?: 4  How many days per week do you miss taking your medication?: 0  Questionnaire about: Chronic problems general questions HPI Form (Submitted on 5/29/2025)  Chief Complaint: Chronic problems general questions HPI Form

## 2025-05-29 NOTE — PATIENT INSTRUCTIONS
You have generalized hypermobility spectrum disorder.    Disjointed book   Facebook support groups for EDS (they can help with HSD as well)  Don't overstretch, work on strengthening muscles/joints  Work with hypermobile PT-->they can help with bracing/taping if needed  Lots of water/electrolytes  Consider echo every 3-5 years      Dr. Parisi

## 2025-05-29 NOTE — Clinical Note
FABIANA. Does not have hEDS but has generalized hypermobility spectrum disorder. I updated his med history and referred him to a hypermobile physical therapist.

## 2025-08-07 ENCOUNTER — THERAPY VISIT (OUTPATIENT)
Dept: PHYSICAL THERAPY | Facility: CLINIC | Age: 30
End: 2025-08-07
Payer: COMMERCIAL

## 2025-08-07 DIAGNOSIS — M35.7 HYPERMOBILITY SYNDROME: Primary | ICD-10-CM

## 2025-08-07 DIAGNOSIS — M62.81 GENERALIZED MUSCLE WEAKNESS: ICD-10-CM
